# Patient Record
Sex: MALE | Race: WHITE | Employment: OTHER | URBAN - METROPOLITAN AREA
[De-identification: names, ages, dates, MRNs, and addresses within clinical notes are randomized per-mention and may not be internally consistent; named-entity substitution may affect disease eponyms.]

---

## 2020-04-27 ENCOUNTER — HOSPITAL ENCOUNTER (OUTPATIENT)
Age: 75
Discharge: HOME | End: 2020-05-01
Payer: MEDICARE

## 2020-04-27 ENCOUNTER — HOSPITAL ENCOUNTER (OUTPATIENT)
Dept: PREADMISSION TESTING | Age: 75
End: 2020-04-27
Payer: MEDICARE

## 2020-04-27 VITALS
HEIGHT: 68 IN | SYSTOLIC BLOOD PRESSURE: 157 MMHG | OXYGEN SATURATION: 97 % | HEART RATE: 62 BPM | WEIGHT: 252 LBS | DIASTOLIC BLOOD PRESSURE: 83 MMHG | BODY MASS INDEX: 38.19 KG/M2 | TEMPERATURE: 98 F | RESPIRATION RATE: 16 BRPM

## 2020-04-27 LAB
ABSOLUTE EOS #: 0.32 K/UL (ref 0–0.44)
ABSOLUTE IMMATURE GRANULOCYTE: 0.06 K/UL (ref 0–0.3)
ABSOLUTE LYMPH #: 1.27 K/UL (ref 1.1–3.7)
ABSOLUTE MONO #: 0.74 K/UL (ref 0.1–1.2)
ANION GAP SERPL CALCULATED.3IONS-SCNC: 16 MMOL/L (ref 9–17)
BASOPHILS # BLD: 1 % (ref 0–2)
BASOPHILS ABSOLUTE: 0.08 K/UL (ref 0–0.2)
BUN BLDV-MCNC: 23 MG/DL (ref 8–23)
CHLORIDE BLD-SCNC: 108 MMOL/L (ref 98–107)
CO2: 19 MMOL/L (ref 20–31)
CREAT SERPL-MCNC: 0.99 MG/DL (ref 0.7–1.2)
DIFFERENTIAL TYPE: ABNORMAL
EOSINOPHILS RELATIVE PERCENT: 4 % (ref 1–4)
GFR AFRICAN AMERICAN: >60 ML/MIN
GFR NON-AFRICAN AMERICAN: >60 ML/MIN
GFR SERPL CREATININE-BSD FRML MDRD: NORMAL ML/MIN/{1.73_M2}
GFR SERPL CREATININE-BSD FRML MDRD: NORMAL ML/MIN/{1.73_M2}
HCT VFR BLD CALC: 47.2 % (ref 40.7–50.3)
HEMOGLOBIN: 15.7 G/DL (ref 13–17)
IMMATURE GRANULOCYTES: 1 %
LYMPHOCYTES # BLD: 14 % (ref 24–43)
MCH RBC QN AUTO: 29.3 PG (ref 25.2–33.5)
MCHC RBC AUTO-ENTMCNC: 33.3 G/DL (ref 28.4–34.8)
MCV RBC AUTO: 88.2 FL (ref 82.6–102.9)
MONOCYTES # BLD: 8 % (ref 3–12)
NRBC AUTOMATED: 0 PER 100 WBC
PDW BLD-RTO: 14.5 % (ref 11.8–14.4)
PLATELET # BLD: 189 K/UL (ref 138–453)
PLATELET ESTIMATE: ABNORMAL
PMV BLD AUTO: 9.7 FL (ref 8.1–13.5)
POTASSIUM SERPL-SCNC: 4.5 MMOL/L (ref 3.7–5.3)
RBC # BLD: 5.35 M/UL (ref 4.21–5.77)
RBC # BLD: ABNORMAL 10*6/UL
SEG NEUTROPHILS: 72 % (ref 36–65)
SEGMENTED NEUTROPHILS ABSOLUTE COUNT: 6.68 K/UL (ref 1.5–8.1)
SODIUM BLD-SCNC: 143 MMOL/L (ref 135–144)
WBC # BLD: 9.2 K/UL (ref 3.5–11.3)
WBC # BLD: ABNORMAL 10*3/UL

## 2020-04-27 PROCEDURE — 36415 COLL VENOUS BLD VENIPUNCTURE: CPT

## 2020-04-27 PROCEDURE — 84520 ASSAY OF UREA NITROGEN: CPT

## 2020-04-27 PROCEDURE — 82565 ASSAY OF CREATININE: CPT

## 2020-04-27 PROCEDURE — 85025 COMPLETE CBC W/AUTO DIFF WBC: CPT

## 2020-04-27 PROCEDURE — 80051 ELECTROLYTE PANEL: CPT

## 2020-04-27 RX ORDER — RAMIPRIL 5 MG/1
5 CAPSULE ORAL DAILY
COMMUNITY

## 2020-04-27 RX ORDER — CEFAZOLIN SODIUM 2 G/50ML
2 SOLUTION INTRAVENOUS ONCE
Status: CANCELLED | OUTPATIENT
Start: 2020-05-14

## 2020-04-27 NOTE — PRE-PROCEDURE INSTRUCTIONS
you.    · Do not wear any jewelry or body piercings day of surgery. Also, NO lotion, perfume or deodorant to be used the day of surgery. No nail polish on the operative extremity (arm/leg surgeries)    · Do not bring any valuables such as jewelry, cash, or credit cards. If you are staying overnight with us, please bring a small bag of personal items.  Please wear loose, comfortable clothing. If you are potentially going to have a cast or brace bring clothing that will fit over them.  In case of illness - If you have cold or flu like symptoms (high fever, runny nose, sore throat, cough, etc.) rash, nausea, vomiting, loose stools, and/or recent contact with someone who has a contagious disease (chicken pox, measles, etc.) Please call your doctor before coming to the hospital.     If your child is having surgery please make arrangements for any other children to be cared for at home on the day of surgery. Other children are not permitted in recovery room and we want you to be able to spend time with the patient. If other arrangements are not available then we suggest that you have a second adult to stay in the waiting room. Day of Surgery/Procedure:    As a patient at [place] [address] you can expect quality medical and nursing care that is centered on your individual needs. Our goal is to make your surgical experience as comfortable as possible    . Transportation After Your Surgery/Procedure: You will need a friend or family member to drive you home after your procedure. Your  must be 25years of age or older and able to sign off on your discharge instructions. A taxi cab or any other form of public transportation is not acceptable. Your friend or family member must stay at the hospital throughout your procedure.     Someone must remain with you for the first 24 hours

## 2020-04-30 LAB
EKG ATRIAL RATE: 63 BPM
EKG P AXIS: 44 DEGREES
EKG P-R INTERVAL: 186 MS
EKG Q-T INTERVAL: 400 MS
EKG QRS DURATION: 82 MS
EKG QTC CALCULATION (BAZETT): 409 MS
EKG R AXIS: -44 DEGREES
EKG T AXIS: 37 DEGREES
EKG VENTRICULAR RATE: 63 BPM

## 2020-05-12 ENCOUNTER — HOSPITAL ENCOUNTER (OUTPATIENT)
Dept: PREADMISSION TESTING | Age: 75
Discharge: HOME | End: 2020-05-16
Payer: MEDICARE

## 2020-05-12 PROCEDURE — U0004 COV-19 TEST NON-CDC HGH THRU: HCPCS

## 2020-05-13 LAB
SARS-COV-2, PCR: NOT DETECTED
SARS-COV-2, RAPID: NORMAL
SARS-COV-2: NORMAL
SOURCE: NORMAL

## 2020-05-14 ENCOUNTER — ANESTHESIA (OUTPATIENT)
Dept: OPERATING ROOM | Age: 75
End: 2020-05-14
Payer: MEDICARE

## 2020-05-14 ENCOUNTER — ANESTHESIA EVENT (OUTPATIENT)
Dept: OPERATING ROOM | Age: 75
End: 2020-05-14
Payer: MEDICARE

## 2020-05-14 ENCOUNTER — HOSPITAL ENCOUNTER (OUTPATIENT)
Age: 75
Setting detail: OUTPATIENT SURGERY
Discharge: HOME | End: 2020-05-14
Attending: SURGERY | Admitting: SURGERY
Payer: MEDICARE

## 2020-05-14 VITALS
HEIGHT: 68 IN | OXYGEN SATURATION: 93 % | DIASTOLIC BLOOD PRESSURE: 75 MMHG | RESPIRATION RATE: 16 BRPM | SYSTOLIC BLOOD PRESSURE: 142 MMHG | HEART RATE: 62 BPM | TEMPERATURE: 97.5 F | BODY MASS INDEX: 38.19 KG/M2 | WEIGHT: 252 LBS

## 2020-05-14 VITALS — OXYGEN SATURATION: 92 % | SYSTOLIC BLOOD PRESSURE: 106 MMHG | DIASTOLIC BLOOD PRESSURE: 64 MMHG | TEMPERATURE: 53.2 F

## 2020-05-14 PROCEDURE — 7100000011 HC PHASE II RECOVERY - ADDTL 15 MIN: Performed by: SURGERY

## 2020-05-14 PROCEDURE — 6360000002 HC RX W HCPCS: Performed by: SURGERY

## 2020-05-14 PROCEDURE — 7100000010 HC PHASE II RECOVERY - FIRST 15 MIN: Performed by: SURGERY

## 2020-05-14 PROCEDURE — 2500000003 HC RX 250 WO HCPCS: Performed by: NURSE ANESTHETIST, CERTIFIED REGISTERED

## 2020-05-14 PROCEDURE — 3700000000 HC ANESTHESIA ATTENDED CARE: Performed by: SURGERY

## 2020-05-14 PROCEDURE — 2580000003 HC RX 258: Performed by: ANESTHESIOLOGY

## 2020-05-14 PROCEDURE — 2500000003 HC RX 250 WO HCPCS: Performed by: SURGERY

## 2020-05-14 PROCEDURE — 3600000019 HC SURGERY ROBOT ADDTL 15MIN: Performed by: SURGERY

## 2020-05-14 PROCEDURE — 6360000002 HC RX W HCPCS: Performed by: NURSE ANESTHETIST, CERTIFIED REGISTERED

## 2020-05-14 PROCEDURE — 3700000001 HC ADD 15 MINUTES (ANESTHESIA): Performed by: SURGERY

## 2020-05-14 PROCEDURE — S2900 ROBOTIC SURGICAL SYSTEM: HCPCS | Performed by: SURGERY

## 2020-05-14 PROCEDURE — C1760 CLOSURE DEV, VASC: HCPCS | Performed by: SURGERY

## 2020-05-14 PROCEDURE — 7100000000 HC PACU RECOVERY - FIRST 15 MIN: Performed by: SURGERY

## 2020-05-14 PROCEDURE — 2580000003 HC RX 258: Performed by: NURSE ANESTHETIST, CERTIFIED REGISTERED

## 2020-05-14 PROCEDURE — 6370000000 HC RX 637 (ALT 250 FOR IP): Performed by: ANESTHESIOLOGY

## 2020-05-14 PROCEDURE — 6360000002 HC RX W HCPCS: Performed by: ANESTHESIOLOGY

## 2020-05-14 PROCEDURE — 2709999900 HC NON-CHARGEABLE SUPPLY: Performed by: SURGERY

## 2020-05-14 PROCEDURE — 7100000001 HC PACU RECOVERY - ADDTL 15 MIN: Performed by: SURGERY

## 2020-05-14 PROCEDURE — 3600000009 HC SURGERY ROBOT BASE: Performed by: SURGERY

## 2020-05-14 PROCEDURE — C1781 MESH (IMPLANTABLE): HCPCS | Performed by: SURGERY

## 2020-05-14 DEVICE — MESH SURG DIA4.5IN CIR SEPRA TECHNOLOGY VENTRALIGHT: Type: IMPLANTABLE DEVICE | Site: ABDOMEN | Status: FUNCTIONAL

## 2020-05-14 RX ORDER — SODIUM CHLORIDE, SODIUM LACTATE, POTASSIUM CHLORIDE, CALCIUM CHLORIDE 600; 310; 30; 20 MG/100ML; MG/100ML; MG/100ML; MG/100ML
INJECTION, SOLUTION INTRAVENOUS CONTINUOUS PRN
Status: DISCONTINUED | OUTPATIENT
Start: 2020-05-14 | End: 2020-05-14 | Stop reason: SDUPTHER

## 2020-05-14 RX ORDER — ONDANSETRON 2 MG/ML
4 INJECTION INTRAMUSCULAR; INTRAVENOUS
Status: DISCONTINUED | OUTPATIENT
Start: 2020-05-14 | End: 2020-05-14 | Stop reason: HOSPADM

## 2020-05-14 RX ORDER — GLYCOPYRROLATE 1 MG/5 ML
SYRINGE (ML) INTRAVENOUS PRN
Status: DISCONTINUED | OUTPATIENT
Start: 2020-05-14 | End: 2020-05-14 | Stop reason: SDUPTHER

## 2020-05-14 RX ORDER — PROPOFOL 10 MG/ML
INJECTION, EMULSION INTRAVENOUS PRN
Status: DISCONTINUED | OUTPATIENT
Start: 2020-05-14 | End: 2020-05-14 | Stop reason: SDUPTHER

## 2020-05-14 RX ORDER — HYDROMORPHONE HCL 110MG/55ML
0.5 PATIENT CONTROLLED ANALGESIA SYRINGE INTRAVENOUS EVERY 5 MIN PRN
Status: DISCONTINUED | OUTPATIENT
Start: 2020-05-14 | End: 2020-05-14 | Stop reason: HOSPADM

## 2020-05-14 RX ORDER — OXYCODONE HYDROCHLORIDE AND ACETAMINOPHEN 5; 325 MG/1; MG/1
2 TABLET ORAL PRN
Status: COMPLETED | OUTPATIENT
Start: 2020-05-14 | End: 2020-05-14

## 2020-05-14 RX ORDER — DEXAMETHASONE SODIUM PHOSPHATE 10 MG/ML
4 INJECTION INTRAMUSCULAR; INTRAVENOUS
Status: DISCONTINUED | OUTPATIENT
Start: 2020-05-14 | End: 2020-05-14 | Stop reason: HOSPADM

## 2020-05-14 RX ORDER — FENTANYL CITRATE 50 UG/ML
25 INJECTION, SOLUTION INTRAMUSCULAR; INTRAVENOUS EVERY 5 MIN PRN
Status: COMPLETED | OUTPATIENT
Start: 2020-05-14 | End: 2020-05-14

## 2020-05-14 RX ORDER — HYDROCODONE BITARTRATE AND ACETAMINOPHEN 5; 325 MG/1; MG/1
1 TABLET ORAL EVERY 6 HOURS PRN
Qty: 18 TABLET | Refills: 0 | Status: SHIPPED | OUTPATIENT
Start: 2020-05-14 | End: 2020-05-19

## 2020-05-14 RX ORDER — BUPIVACAINE HYDROCHLORIDE AND EPINEPHRINE 5; 5 MG/ML; UG/ML
INJECTION, SOLUTION EPIDURAL; INTRACAUDAL; PERINEURAL PRN
Status: DISCONTINUED | OUTPATIENT
Start: 2020-05-14 | End: 2020-05-14 | Stop reason: ALTCHOICE

## 2020-05-14 RX ORDER — LABETALOL HYDROCHLORIDE 5 MG/ML
5 INJECTION, SOLUTION INTRAVENOUS EVERY 10 MIN PRN
Status: DISCONTINUED | OUTPATIENT
Start: 2020-05-14 | End: 2020-05-14 | Stop reason: HOSPADM

## 2020-05-14 RX ORDER — DIPHENHYDRAMINE HYDROCHLORIDE 50 MG/ML
12.5 INJECTION INTRAMUSCULAR; INTRAVENOUS
Status: DISCONTINUED | OUTPATIENT
Start: 2020-05-14 | End: 2020-05-14 | Stop reason: HOSPADM

## 2020-05-14 RX ORDER — NEOSTIGMINE METHYLSULFATE 5 MG/5 ML
SYRINGE (ML) INTRAVENOUS PRN
Status: DISCONTINUED | OUTPATIENT
Start: 2020-05-14 | End: 2020-05-14 | Stop reason: SDUPTHER

## 2020-05-14 RX ORDER — ONDANSETRON 2 MG/ML
INJECTION INTRAMUSCULAR; INTRAVENOUS PRN
Status: DISCONTINUED | OUTPATIENT
Start: 2020-05-14 | End: 2020-05-14 | Stop reason: SDUPTHER

## 2020-05-14 RX ORDER — OXYCODONE HYDROCHLORIDE AND ACETAMINOPHEN 5; 325 MG/1; MG/1
1 TABLET ORAL PRN
Status: COMPLETED | OUTPATIENT
Start: 2020-05-14 | End: 2020-05-14

## 2020-05-14 RX ORDER — SODIUM CHLORIDE 9 MG/ML
INJECTION, SOLUTION INTRAVENOUS CONTINUOUS
Status: DISCONTINUED | OUTPATIENT
Start: 2020-05-14 | End: 2020-05-14

## 2020-05-14 RX ORDER — FENTANYL CITRATE 50 UG/ML
25 INJECTION, SOLUTION INTRAMUSCULAR; INTRAVENOUS EVERY 5 MIN PRN
Status: DISCONTINUED | OUTPATIENT
Start: 2020-05-14 | End: 2020-05-14 | Stop reason: HOSPADM

## 2020-05-14 RX ORDER — SODIUM CHLORIDE 0.9 % (FLUSH) 0.9 %
10 SYRINGE (ML) INJECTION PRN
Status: DISCONTINUED | OUTPATIENT
Start: 2020-05-14 | End: 2020-05-14 | Stop reason: HOSPADM

## 2020-05-14 RX ORDER — MEPERIDINE HYDROCHLORIDE 50 MG/ML
12.5 INJECTION INTRAMUSCULAR; INTRAVENOUS; SUBCUTANEOUS EVERY 5 MIN PRN
Status: DISCONTINUED | OUTPATIENT
Start: 2020-05-14 | End: 2020-05-14 | Stop reason: HOSPADM

## 2020-05-14 RX ORDER — ROCURONIUM BROMIDE 10 MG/ML
INJECTION, SOLUTION INTRAVENOUS PRN
Status: DISCONTINUED | OUTPATIENT
Start: 2020-05-14 | End: 2020-05-14 | Stop reason: SDUPTHER

## 2020-05-14 RX ORDER — HYDRALAZINE HYDROCHLORIDE 20 MG/ML
5 INJECTION INTRAMUSCULAR; INTRAVENOUS EVERY 10 MIN PRN
Status: DISCONTINUED | OUTPATIENT
Start: 2020-05-14 | End: 2020-05-14 | Stop reason: HOSPADM

## 2020-05-14 RX ORDER — CEFAZOLIN SODIUM 2 G/50ML
2 SOLUTION INTRAVENOUS ONCE
Status: COMPLETED | OUTPATIENT
Start: 2020-05-14 | End: 2020-05-14

## 2020-05-14 RX ORDER — LIDOCAINE HYDROCHLORIDE 10 MG/ML
1 INJECTION, SOLUTION EPIDURAL; INFILTRATION; INTRACAUDAL; PERINEURAL
Status: DISCONTINUED | OUTPATIENT
Start: 2020-05-14 | End: 2020-05-14 | Stop reason: HOSPADM

## 2020-05-14 RX ORDER — SODIUM CHLORIDE 0.9 % (FLUSH) 0.9 %
10 SYRINGE (ML) INJECTION EVERY 12 HOURS SCHEDULED
Status: DISCONTINUED | OUTPATIENT
Start: 2020-05-14 | End: 2020-05-14 | Stop reason: HOSPADM

## 2020-05-14 RX ORDER — FENTANYL CITRATE 50 UG/ML
INJECTION, SOLUTION INTRAMUSCULAR; INTRAVENOUS PRN
Status: DISCONTINUED | OUTPATIENT
Start: 2020-05-14 | End: 2020-05-14 | Stop reason: SDUPTHER

## 2020-05-14 RX ORDER — LIDOCAINE HYDROCHLORIDE 20 MG/ML
INJECTION, SOLUTION EPIDURAL; INFILTRATION; INTRACAUDAL; PERINEURAL PRN
Status: DISCONTINUED | OUTPATIENT
Start: 2020-05-14 | End: 2020-05-14 | Stop reason: SDUPTHER

## 2020-05-14 RX ORDER — SODIUM CHLORIDE, SODIUM LACTATE, POTASSIUM CHLORIDE, CALCIUM CHLORIDE 600; 310; 30; 20 MG/100ML; MG/100ML; MG/100ML; MG/100ML
INJECTION, SOLUTION INTRAVENOUS CONTINUOUS
Status: DISCONTINUED | OUTPATIENT
Start: 2020-05-14 | End: 2020-05-14 | Stop reason: HOSPADM

## 2020-05-14 RX ORDER — DEXAMETHASONE SODIUM PHOSPHATE 10 MG/ML
INJECTION INTRAMUSCULAR; INTRAVENOUS PRN
Status: DISCONTINUED | OUTPATIENT
Start: 2020-05-14 | End: 2020-05-14 | Stop reason: SDUPTHER

## 2020-05-14 RX ADMIN — ONDANSETRON 4 MG: 2 INJECTION, SOLUTION INTRAMUSCULAR; INTRAVENOUS at 08:56

## 2020-05-14 RX ADMIN — SODIUM CHLORIDE, POTASSIUM CHLORIDE, SODIUM LACTATE AND CALCIUM CHLORIDE: 600; 310; 30; 20 INJECTION, SOLUTION INTRAVENOUS at 06:25

## 2020-05-14 RX ADMIN — SODIUM CHLORIDE, POTASSIUM CHLORIDE, SODIUM LACTATE AND CALCIUM CHLORIDE: 600; 310; 30; 20 INJECTION, SOLUTION INTRAVENOUS at 07:25

## 2020-05-14 RX ADMIN — Medication 50 MCG: at 07:25

## 2020-05-14 RX ADMIN — Medication 3 MG: at 09:02

## 2020-05-14 RX ADMIN — FENTANYL CITRATE 25 MCG: 50 INJECTION INTRAMUSCULAR; INTRAVENOUS at 10:10

## 2020-05-14 RX ADMIN — FENTANYL CITRATE 25 MCG: 50 INJECTION INTRAMUSCULAR; INTRAVENOUS at 09:53

## 2020-05-14 RX ADMIN — OXYCODONE HYDROCHLORIDE AND ACETAMINOPHEN 1 TABLET: 5; 325 TABLET ORAL at 10:25

## 2020-05-14 RX ADMIN — Medication 0.4 MG: at 07:47

## 2020-05-14 RX ADMIN — Medication 0.6 MG: at 09:02

## 2020-05-14 RX ADMIN — Medication 50 MCG: at 07:30

## 2020-05-14 RX ADMIN — ROCURONIUM BROMIDE 50 MG: 10 INJECTION, SOLUTION INTRAVENOUS at 07:30

## 2020-05-14 RX ADMIN — LIDOCAINE HYDROCHLORIDE 60 MG: 20 INJECTION, SOLUTION EPIDURAL; INFILTRATION; INTRACAUDAL; PERINEURAL at 07:30

## 2020-05-14 RX ADMIN — DEXAMETHASONE SODIUM PHOSPHATE 10 MG: 10 INJECTION INTRAMUSCULAR; INTRAVENOUS at 07:30

## 2020-05-14 RX ADMIN — PROPOFOL 200 MG: 10 INJECTION, EMULSION INTRAVENOUS at 07:30

## 2020-05-14 RX ADMIN — FENTANYL CITRATE 25 MCG: 50 INJECTION INTRAMUSCULAR; INTRAVENOUS at 09:37

## 2020-05-14 RX ADMIN — FENTANYL CITRATE 25 MCG: 50 INJECTION INTRAMUSCULAR; INTRAVENOUS at 09:44

## 2020-05-14 RX ADMIN — CEFAZOLIN SODIUM 2 G: 2 SOLUTION INTRAVENOUS at 07:38

## 2020-05-14 ASSESSMENT — PULMONARY FUNCTION TESTS
PIF_VALUE: 30
PIF_VALUE: 10
PIF_VALUE: 20
PIF_VALUE: 26
PIF_VALUE: 26
PIF_VALUE: 25
PIF_VALUE: 11
PIF_VALUE: 24
PIF_VALUE: 1
PIF_VALUE: 20
PIF_VALUE: 11
PIF_VALUE: 30
PIF_VALUE: 28
PIF_VALUE: 24
PIF_VALUE: 28
PIF_VALUE: 2
PIF_VALUE: 19
PIF_VALUE: 25
PIF_VALUE: 19
PIF_VALUE: 31
PIF_VALUE: 20
PIF_VALUE: 31
PIF_VALUE: 30
PIF_VALUE: 19
PIF_VALUE: 30
PIF_VALUE: 31
PIF_VALUE: 30
PIF_VALUE: 19
PIF_VALUE: 1
PIF_VALUE: 30
PIF_VALUE: 26
PIF_VALUE: 25
PIF_VALUE: 21
PIF_VALUE: 29
PIF_VALUE: 19
PIF_VALUE: 19
PIF_VALUE: 20
PIF_VALUE: 19
PIF_VALUE: 30
PIF_VALUE: 29
PIF_VALUE: 28
PIF_VALUE: 30
PIF_VALUE: 29
PIF_VALUE: 1
PIF_VALUE: 19
PIF_VALUE: 29
PIF_VALUE: 29
PIF_VALUE: 30
PIF_VALUE: 2
PIF_VALUE: 28
PIF_VALUE: 28
PIF_VALUE: 30
PIF_VALUE: 30
PIF_VALUE: 1
PIF_VALUE: 30
PIF_VALUE: 27
PIF_VALUE: 28
PIF_VALUE: 26
PIF_VALUE: 27
PIF_VALUE: 20
PIF_VALUE: 28
PIF_VALUE: 29
PIF_VALUE: 29
PIF_VALUE: 11
PIF_VALUE: 30
PIF_VALUE: 19
PIF_VALUE: 30
PIF_VALUE: 1
PIF_VALUE: 9
PIF_VALUE: 13
PIF_VALUE: 28
PIF_VALUE: 20
PIF_VALUE: 29
PIF_VALUE: 29
PIF_VALUE: 19
PIF_VALUE: 26
PIF_VALUE: 25
PIF_VALUE: 0
PIF_VALUE: 25
PIF_VALUE: 10
PIF_VALUE: 28
PIF_VALUE: 25
PIF_VALUE: 27
PIF_VALUE: 29
PIF_VALUE: 25
PIF_VALUE: 20
PIF_VALUE: 29
PIF_VALUE: 14
PIF_VALUE: 30
PIF_VALUE: 25
PIF_VALUE: 20
PIF_VALUE: 0
PIF_VALUE: 26
PIF_VALUE: 30
PIF_VALUE: 8
PIF_VALUE: 29
PIF_VALUE: 24
PIF_VALUE: 19
PIF_VALUE: 25
PIF_VALUE: 30
PIF_VALUE: 31
PIF_VALUE: 29

## 2020-05-14 ASSESSMENT — PAIN - FUNCTIONAL ASSESSMENT: PAIN_FUNCTIONAL_ASSESSMENT: 0-10

## 2020-05-14 ASSESSMENT — PAIN SCALES - GENERAL
PAINLEVEL_OUTOF10: 5
PAINLEVEL_OUTOF10: 5
PAINLEVEL_OUTOF10: 4
PAINLEVEL_OUTOF10: 5
PAINLEVEL_OUTOF10: 4

## 2020-05-14 NOTE — ANESTHESIA PRE PROCEDURE
Current Facility-Administered Medications   Medication Dose Route Frequency Provider Last Rate Last Dose    lactated ringers infusion   Intravenous Continuous Livan Fish  mL/hr at 05/14/20 0786      sodium chloride flush 0.9 % injection 10 mL  10 mL Intravenous 2 times per day Livan Fish MD        sodium chloride flush 0.9 % injection 10 mL  10 mL Intravenous PRN Livan Fish MD        lidocaine PF 1 % injection 1 mL  1 mL Intradermal Once PRN Livan Fish MD        ceFAZolin (ANCEF) 2 g in dextrose 3 % 50 mL IVPB (duplex)  2 g Intravenous Once Jamilah Mujica MD           Allergies:  No Known Allergies    Problem List:  There is no problem list on file for this patient. Past Medical History:        Diagnosis Date    Cancer [facility]) 10/23/2009    PROSTATE    History of cardiac murmur as a child     Hyperlipidemia     Hypertension     Urinary incontinence     Due to prostate surgery per pt. Past Surgical History:        Procedure Laterality Date    APPENDECTOMY      PROSTATECTOMY  10/23/2009    WRIST SURGERY Right 2017       Social History:    Social History     Tobacco Use    Smoking status: Never Smoker    Smokeless tobacco: Never Used   Substance Use Topics    Alcohol use:  Yes     Alcohol/week: 0.0 standard drinks     Comment: Occasionally                                Counseling given: Not Answered      Vital Signs (Current):   Vitals:    05/14/20 0610 05/14/20 0612   BP: (!) 174/88    Pulse: 62    Resp: 18    Temp: 98.6 °F (37 °C)    TempSrc: Temporal    SpO2: 96%    Weight:  252 lb (114.3 kg)   Height:  5' 8\" (1.727 m)                                              BP Readings from Last 3 Encounters:   05/14/20 (!) 174/88   04/27/20 (!) 157/83   05/18/16 140/79       NPO Status: Time of last liquid consumption: 1800                        Time of last solid consumption: 1800                        Date of last liquid consumption: 05/13/20                        Date of last solid food consumption: 05/13/20    BMI:   Wt Readings from Last 3 Encounters:   05/14/20 252 lb (114.3 kg)   04/27/20 252 lb (114.3 kg)   05/18/16 229 lb (103.9 kg)     Body mass index is 38.32 kg/m². CBC:   Lab Results   Component Value Date    WBC 9.2 04/27/2020    RBC 5.35 04/27/2020    HGB 15.7 04/27/2020    HCT 47.2 04/27/2020    MCV 88.2 04/27/2020    RDW 14.5 04/27/2020     04/27/2020       CMP:   Lab Results   Component Value Date     04/27/2020    K 4.5 04/27/2020     04/27/2020    CO2 19 04/27/2020    BUN 23 04/27/2020    CREATININE 0.99 04/27/2020    GFRAA >60 04/27/2020    LABGLOM >60 04/27/2020       POC Tests: No results for input(s): POCGLU, POCNA, POCK, POCCL, POCBUN, POCHEMO, POCHCT in the last 72 hours. Coags: No results found for: PROTIME, INR, APTT    HCG (If Applicable): No results found for: PREGTESTUR, PREGSERUM, HCG, HCGQUANT     ABGs: No results found for: PHART, PO2ART, NSP2DRQ, TFP6IKU, BEART, D1EERQIW     Type & Screen (If Applicable):  No results found for: LABABO, LABRH    Drug/Infectious Status (If Applicable):  No results found for: HIV, HEPCAB    COVID-19 Screening (If Applicable):   Lab Results   Component Value Date    COVID19 Not Detected 05/12/2020         Anesthesia Evaluation  Patient summary reviewed and Nursing notes reviewed  Airway: Mallampati: II  TM distance: >3 FB   Neck ROM: full  Mouth opening: > = 3 FB Dental: normal exam         Pulmonary:normal exam  breath sounds clear to auscultation                             Cardiovascular:  Exercise tolerance: good (>4 METS),           Rhythm: regular  Rate: normal                    Neuro/Psych:               GI/Hepatic/Renal:             Endo/Other:                     Abdominal:       Abdomen: soft. Vascular:                                        Anesthesia Plan      general     ASA 3       Induction: intravenous.     MIPS: Postoperative opioids intended and Prophylactic antiemetics

## 2020-05-14 NOTE — H&P
mouth    Historical Provider, MD   Vitamin E 200 UNITS TABS Take 400 mg by mouth    Historical Provider, MD   Cholecalciferol (VITAMIN D3) 5000 UNITS TABS Take by mouth    Historical Provider, MD   Ascorbic Acid (VITAMIN C) 500 MG CAPS Take by mouth    Historical Provider, MD   Multiple Vitamins-Minerals (THERAPEUTIC MULTIVITAMIN-MINERALS) tablet Take 1 tablet by mouth daily    Historical Provider, MD   Arginine 500 MG CAPS Take by mouth    Historical Provider, MD         This is a 70 y.o. obese male who is pleasant, cooperative, alert and oriented x3, in no acute distress. Heart: Heart sounds are normal.  HR 62 regular rate and rhythm without murmur, gallop or rub. Lungs: Normal respiratory effort with good air exchange, unlabored and clear to auscultation without wheezes or rales bilaterally   Abdomen: soft reducible umbilical hernia w/o tenderness Abdomen nontender, nondistended with bowel sounds . Labs:  Recent Labs     04/27/20  0945   HGB 15.7   HCT 47.2   WBC 9.2   MCV 88.2         K 4.5   *   CO2 19*   BUN 23   CREATININE 0.99       Recent Labs     05/12/20  1000   COVID19 Not Detected                   Myak, Romana Gladden APRN, ANP-BC  Electronically signed 5/14/2020 at 6:15 AM      DELANEY Nix - CNP   Nurse Practitioner   General Surgery   H&P   Cosign Needed   Date of Service:  4/27/2020  9:00 AM               Cosign Needed        Expand All Collapse All     History and Physical Service   [facility]            Date of Evaluation:     4/27/2020  Patient name:              Lynne Clark  MRN:                           [id number]  YOB: 1945  PCP:                            Yi Anand     History Obtained From:      Patient     History of Present Illness:       This is Lynne Clark a 70 y.o. male who presents for a pre-admission testing appointment for an upcoming BUN 23 8 - 23 mg/dL     CREATININE 0.99 0.70 - 1.20 mg/dL     GFR Non-African American >60 >60 mL/min     GFR African American >60 >60 mL/min     GFR Comment            GFR Staging NOT REPORTED     CBC Auto Differential     Collection Time: 20  9:45 AM   Result Value Ref Range     WBC 9.2 3.5 - 11.3 k/uL     RBC 5.35 4.21 - 5.77 m/uL     Hemoglobin 15.7 13.0 - 17.0 g/dL     Hematocrit 47.2 40.7 - 50.3 %     MCV 88.2 82.6 - 102.9 fL     MCH 29.3 25.2 - 33.5 pg     MCHC 33.3 28.4 - 34.8 g/dL     RDW 14.5 (H) 11.8 - 14.4 %     Platelets 710 662 - 078 k/uL     MPV 9.7 8.1 - 13.5 fL     NRBC Automated 0.0 0.0 per 100 WBC     Differential Type NOT REPORTED       Seg Neutrophils 72 (H) 36 - 65 %     Lymphocytes 14 (L) 24 - 43 %     Monocytes 8 3 - 12 %     Eosinophils % 4 1 - 4 %     Basophils 1 0 - 2 %     Immature Granulocytes 1 (H) 0 %     Segs Absolute 6.68 1.50 - 8.10 k/uL     Absolute Lymph # 1.27 1.10 - 3.70 k/uL     Absolute Mono # 0.74 0.10 - 1.20 k/uL     Absolute Eos # 0.32 0.00 - 0.44 k/uL     Basophils Absolute 0.08 0.00 - 0.20 k/uL     Absolute Immature Granulocyte 0.06 0.00 - 0.30 k/uL     WBC Morphology NOT REPORTED       RBC Morphology ANISOCYTOSIS PRESENT       Platelet Estimate NOT REPORTED     Electrolyte Panel     Collection Time: 20  9:45 AM   Result Value Ref Range     Sodium 143 135 - 144 mmol/L     Potassium 4.5 3.7 - 5.3 mmol/L     Chloride 108 (H) 98 - 107 mmol/L     CO2 19 (L) 20 - 31 mmol/L     Anion Gap 16 9 - 17 mmol/L                Recent Labs     20  0945   HGB 15.7   HCT 47.2   WBC 9.2   MCV 88.2      K 4.5   *   CO2 19*   BUN 23   CREATININE 0.99         EK2020. See Epic. Diagnosis:       1. Incisional hernia     Plans:      1.  Laparoscopic robotic incisional hernia repair with mesh        DELANEY Pedersen CNP  2020  10:18 AM               ·   Pre-Admission Testing Visit (Canceled) on 2020   ·   ·   Routing History ·   ·   Detailed Report   ·   H&P Info     Author Note Status Last Update User   DELANEY Sharma CNP Cosign Needed DELANEY Sharma CNP   Last Update Date/Time: 4/27/2020 10:22 AM   Chart Review Routing History     No routing history on file.

## 2023-09-21 ENCOUNTER — HOSPITAL ENCOUNTER (OUTPATIENT)
Dept: PREADMISSION TESTING | Age: 78
Discharge: HOME | End: 2023-09-21
Payer: MEDICARE

## 2023-09-21 VITALS
TEMPERATURE: 97.1 F | BODY MASS INDEX: 37.89 KG/M2 | DIASTOLIC BLOOD PRESSURE: 70 MMHG | OXYGEN SATURATION: 98 % | HEIGHT: 68 IN | SYSTOLIC BLOOD PRESSURE: 132 MMHG | HEART RATE: 65 BPM | RESPIRATION RATE: 18 BRPM | WEIGHT: 250 LBS

## 2023-09-21 LAB
ABO + RH BLD: NORMAL
ALBUMIN SERPL-MCNC: 4 G/DL (ref 3.5–5.2)
ALP SERPL-CCNC: 60 U/L (ref 40–129)
ALT SERPL-CCNC: 34 U/L (ref 5–41)
ANION GAP SERPL CALCULATED.3IONS-SCNC: 12 MMOL/L (ref 9–17)
ARM BAND NUMBER: NORMAL
AST SERPL-CCNC: 24 U/L
BASOPHILS # BLD: 0.07 K/UL (ref 0–0.2)
BASOPHILS NFR BLD: 1 % (ref 0–2)
BILIRUB SERPL-MCNC: 0.5 MG/DL (ref 0.3–1.2)
BILIRUB UR QL STRIP: NEGATIVE
BLOOD BANK SAMPLE EXPIRATION: NORMAL
BLOOD GROUP ANTIBODIES SERPL: NEGATIVE
BUN SERPL-MCNC: 27 MG/DL (ref 8–23)
BUN/CREAT SERPL: 27 (ref 9–20)
CALCIUM SERPL-MCNC: 8.8 MG/DL (ref 8.6–10.4)
CHLORIDE SERPL-SCNC: 105 MMOL/L (ref 98–107)
CLARITY UR: CLEAR
CO2 SERPL-SCNC: 21 MMOL/L (ref 20–31)
COLOR UR: YELLOW
COMMENT: NORMAL
CREAT SERPL-MCNC: 1 MG/DL (ref 0.7–1.2)
EOSINOPHIL # BLD: 0.24 K/UL (ref 0–0.44)
EOSINOPHILS RELATIVE PERCENT: 2 % (ref 1–4)
ERYTHROCYTE [DISTWIDTH] IN BLOOD BY AUTOMATED COUNT: 14.5 % (ref 11.8–14.4)
ERYTHROCYTE [SEDIMENTATION RATE] IN BLOOD BY PHOTOMETRIC METHOD: 18 MM/HR (ref 0–20)
GFR SERPL CREATININE-BSD FRML MDRD: >60 ML/MIN/1.73M2
GLUCOSE SERPL-MCNC: 111 MG/DL (ref 70–99)
GLUCOSE UR STRIP-MCNC: NEGATIVE MG/DL
HCT VFR BLD AUTO: 42 % (ref 40.7–50.3)
HGB BLD-MCNC: 14.1 G/DL (ref 13–17)
HGB UR QL STRIP.AUTO: NEGATIVE
IMM GRANULOCYTES # BLD AUTO: 0.11 K/UL (ref 0–0.3)
IMM GRANULOCYTES NFR BLD: 1 %
INR PPP: 1.1
KETONES UR STRIP-MCNC: NEGATIVE MG/DL
LEUKOCYTE ESTERASE UR QL STRIP: NEGATIVE
LYMPHOCYTES NFR BLD: 1.64 K/UL (ref 1.1–3.7)
LYMPHOCYTES RELATIVE PERCENT: 17 % (ref 24–43)
MCH RBC QN AUTO: 30.9 PG (ref 25.2–33.5)
MCHC RBC AUTO-ENTMCNC: 33.6 G/DL (ref 28.4–34.8)
MCV RBC AUTO: 91.9 FL (ref 82.6–102.9)
MONOCYTES NFR BLD: 0.85 K/UL (ref 0.1–1.2)
MONOCYTES NFR BLD: 9 % (ref 3–12)
NEUTROPHILS NFR BLD: 70 % (ref 36–65)
NEUTS SEG NFR BLD: 7.01 K/UL (ref 1.5–8.1)
NITRITE UR QL STRIP: NEGATIVE
NRBC BLD-RTO: 0 PER 100 WBC
PARTIAL THROMBOPLASTIN TIME: 32.3 SEC (ref 23.9–33.8)
PH UR STRIP: 6 [PH] (ref 5–8)
PLATELET # BLD AUTO: 187 K/UL (ref 138–453)
PMV BLD AUTO: 9.4 FL (ref 8.1–13.5)
POTASSIUM SERPL-SCNC: 4.1 MMOL/L (ref 3.7–5.3)
PROT SERPL-MCNC: 6.6 G/DL (ref 6.4–8.3)
PROT UR STRIP-MCNC: NEGATIVE MG/DL
PROTHROMBIN TIME: 13.6 SEC (ref 11.5–14.2)
RBC # BLD AUTO: 4.57 M/UL (ref 4.21–5.77)
RBC # BLD: ABNORMAL 10*6/UL
SODIUM SERPL-SCNC: 138 MMOL/L (ref 135–144)
SP GR UR STRIP: 1 (ref 1–1.03)
UROBILINOGEN UR STRIP-ACNC: NORMAL EU/DL (ref 0–1)
WBC OTHER # BLD: 9.9 K/UL (ref 3.5–11.3)

## 2023-09-21 PROCEDURE — 86850 RBC ANTIBODY SCREEN: CPT

## 2023-09-21 PROCEDURE — 80053 COMPREHEN METABOLIC PANEL: CPT

## 2023-09-21 PROCEDURE — 85730 THROMBOPLASTIN TIME PARTIAL: CPT

## 2023-09-21 PROCEDURE — 93005 ELECTROCARDIOGRAM TRACING: CPT | Performed by: ORTHOPAEDIC SURGERY

## 2023-09-21 PROCEDURE — 36415 COLL VENOUS BLD VENIPUNCTURE: CPT

## 2023-09-21 PROCEDURE — 85652 RBC SED RATE AUTOMATED: CPT

## 2023-09-21 PROCEDURE — 87641 MR-STAPH DNA AMP PROBE: CPT

## 2023-09-21 PROCEDURE — 85025 COMPLETE CBC W/AUTO DIFF WBC: CPT

## 2023-09-21 PROCEDURE — 86901 BLOOD TYPING SEROLOGIC RH(D): CPT

## 2023-09-21 PROCEDURE — 85610 PROTHROMBIN TIME: CPT

## 2023-09-21 PROCEDURE — 81003 URINALYSIS AUTO W/O SCOPE: CPT

## 2023-09-21 PROCEDURE — 86900 BLOOD TYPING SEROLOGIC ABO: CPT

## 2023-09-21 RX ORDER — ACETAMINOPHEN 500 MG
1000 TABLET ORAL ONCE
OUTPATIENT
Start: 2023-10-18

## 2023-09-21 RX ORDER — GABAPENTIN 300 MG/1
300 CAPSULE ORAL ONCE
OUTPATIENT
Start: 2023-09-21 | End: 2023-09-21

## 2023-09-21 RX ORDER — LISINOPRIL 10 MG/1
10 TABLET ORAL DAILY
COMMUNITY

## 2023-09-21 RX ORDER — CELECOXIB 200 MG/1
200 CAPSULE ORAL ONCE
OUTPATIENT
Start: 2023-10-18

## 2023-09-21 RX ORDER — DIPHENHYDRAMINE HCL 25 MG
25 CAPSULE ORAL EVERY 6 HOURS PRN
COMMUNITY

## 2023-09-21 RX ORDER — ATORVASTATIN CALCIUM 10 MG/1
10 TABLET, FILM COATED ORAL DAILY
COMMUNITY

## 2023-09-21 ASSESSMENT — PROMIS GLOBAL HEALTH SCALE
SUM OF RESPONSES TO QUESTIONS 3, 6, 7, & 8: 13
IN GENERAL, HOW WOULD YOU RATE YOUR MENTAL HEALTH, INCLUDING YOUR MOOD AND YOUR ABILITY TO THINK [ON A SCALE OF 1 (POOR) TO 5 (EXCELLENT)]?: 4
IN GENERAL, HOW WOULD YOU RATE YOUR SATISFACTION WITH YOUR SOCIAL ACTIVITIES AND RELATIONSHIPS [ON A SCALE OF 1 (POOR) TO 5 (EXCELLENT)]?: 5
IN GENERAL, PLEASE RATE HOW WELL YOU CARRY OUT YOUR USUAL SOCIAL ACTIVITIES (INCLUDES ACTIVITIES AT HOME, AT WORK, AND IN YOUR COMMUNITY, AND RESPONSIBILITIES AS A PARENT, CHILD, SPOUSE, EMPLOYEE, FRIEND, ETC) [ON A SCALE OF 1 (POOR) TO 5 (EXCELLENT)]?: 4
IN THE PAST 7 DAYS, HOW OFTEN HAVE YOU BEEN BOTHERED BY EMOTIONAL PROBLEMS, SUCH AS FEELING ANXIOUS, DEPRESSED, OR IRRITABLE [ON A SCALE FROM 1 (NEVER) TO 5 (ALWAYS)]?: 5
HOW IS THE PROMIS V1.1 BEING ADMINISTERED?: 0
IN GENERAL, WOULD YOU SAY YOUR HEALTH IS...[ON A SCALE OF 1 (POOR) TO 5 (EXCELLENT)]: 4
WHO IS THE PERSON COMPLETING THE PROMIS V1.1 SURVEY?: 0
IN GENERAL, WOULD YOU SAY YOUR QUALITY OF LIFE IS...[ON A SCALE OF 1 (POOR) TO 5 (EXCELLENT)]: 4
IN GENERAL, HOW WOULD YOU RATE YOUR PHYSICAL HEALTH [ON A SCALE OF 1 (POOR) TO 5 (EXCELLENT)]?: 4
TO WHAT EXTENT ARE YOU ABLE TO CARRY OUT YOUR EVERYDAY PHYSICAL ACTIVITIES SUCH AS WALKING, CLIMBING STAIRS, CARRYING GROCERIES, OR MOVING A CHAIR [ON A SCALE OF 1 (NOT AT ALL) TO 5 (COMPLETELY)]?: 3
IN THE PAST 7 DAYS, HOW WOULD YOU RATE YOUR PAIN ON AVERAGE [ON A SCALE FROM 0 (NO PAIN) TO 10 (WORST IMAGINABLE PAIN)]?: 3
SUM OF RESPONSES TO QUESTIONS 2, 4, 5, & 10: 18
IN THE PAST 7 DAYS, HOW WOULD YOU RATE YOUR FATIGUE ON AVERAGE [ON A SCALE FROM 1 (NONE) TO 5 (VERY SEVERE)]?: 3

## 2023-09-21 ASSESSMENT — KOOS JR
HOW SEVERE IS YOUR KNEE STIFFNESS AFTER FIRST WAKING IN MORNING: 2
KOOS JR TOTAL INTERVAL SCORE: 63.776
GOING UP OR DOWN STAIRS: 2
STRAIGHTENING KNEE FULLY: 0
STANDING UPRIGHT: 0
BENDING TO THE FLOOR TO PICK UP OBJECT: 2
RISING FROM SITTING: 1
TWISING OR PIVOTING ON KNEE: 2

## 2023-09-21 NOTE — PRE-PROCEDURE INSTRUCTIONS
stay with you, your procedure may be cancelled. As a patient at [facility] you can expect quality medical and nursing care that is centered on you individual needs. Our goal is to make your surgical experience as comfortable as possible.     Any questions about preparing for your surgery please call [phone].      ____________________________   ____________________________  Signature (Patient)                                 Signature (Nurse)                     Date

## 2023-09-22 LAB
MRSA, DNA, NASAL: NEGATIVE
SPECIMEN DESCRIPTION: NORMAL

## 2023-09-23 LAB
EKG ATRIAL RATE: 68 BPM
EKG P AXIS: 37 DEGREES
EKG P-R INTERVAL: 158 MS
EKG Q-T INTERVAL: 390 MS
EKG QRS DURATION: 80 MS
EKG QTC CALCULATION (BAZETT): 414 MS
EKG R AXIS: -35 DEGREES
EKG T AXIS: 44 DEGREES
EKG VENTRICULAR RATE: 68 BPM

## 2023-09-23 PROCEDURE — 93010 ELECTROCARDIOGRAM REPORT: CPT | Performed by: INTERNAL MEDICINE

## 2023-09-25 NOTE — PROGRESS NOTES
[facility] ([facility]) Joint Replacement Pre-surgical Assessment    Scheduled Surgery Date: 10/18/2023  Surgery Time: 0730    Surgeon: Effie Rogers  Procedure: left Total Knee    Primary Insurance Coverage [employer] CHOICE  [facility]  Pre-op class attended YES 0930    PCP: Windy Laws,   Clearance received by PCP: Yes    Anticipated Discharge Plan: home  Agency (if applicable): OPPT    Significant PMH:   Surgical History    Procedure Laterality Date Comment Source   APPENDECTOMY       CATARACT EXTRACTION       HERNIA REPAIR N/A 05/14/2020 HERNIA INCISIONAL  REPAIR LAPAROSCOPIC ROBOTIC XI WITH MESH performed by Mina Orozco MD at [address] [place] [address]  10/23/2009     [place]     WRIST SURGERY Right 2017       ED Notes    ED Notes    Medical History    Diagnosis Date Comment Source   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 Smoking history: none    Alcohol history: Never drinks    Concerns prior to surgery: PT MET WITH OT AFTER CLASS, PT HAS A FWW.     Electronically signed by: Sangita Duran RN on 9/25/2023 at 9:30 AM

## 2023-10-18 ENCOUNTER — APPOINTMENT (OUTPATIENT)
Dept: GENERAL RADIOLOGY | Age: 78
End: 2023-10-18
Attending: ORTHOPAEDIC SURGERY
Payer: MEDICARE

## 2023-10-18 ENCOUNTER — ANESTHESIA EVENT (OUTPATIENT)
Dept: OPERATING ROOM | Age: 78
End: 2023-10-18
Payer: MEDICARE

## 2023-10-18 ENCOUNTER — HOSPITAL ENCOUNTER (OUTPATIENT)
Age: 78
Discharge: HOME | End: 2023-10-18
Attending: ORTHOPAEDIC SURGERY | Admitting: ORTHOPAEDIC SURGERY
Payer: MEDICARE

## 2023-10-18 ENCOUNTER — ANESTHESIA (OUTPATIENT)
Dept: OPERATING ROOM | Age: 78
End: 2023-10-18
Payer: MEDICARE

## 2023-10-18 VITALS
SYSTOLIC BLOOD PRESSURE: 132 MMHG | RESPIRATION RATE: 16 BRPM | DIASTOLIC BLOOD PRESSURE: 72 MMHG | TEMPERATURE: 97.5 F | OXYGEN SATURATION: 92 % | BODY MASS INDEX: 37.4 KG/M2 | HEART RATE: 61 BPM | WEIGHT: 246 LBS

## 2023-10-18 PROBLEM — M17.12 PRIMARY OSTEOARTHRITIS OF LEFT KNEE: Chronic | Status: ACTIVE | Noted: 2023-10-18

## 2023-10-18 PROBLEM — E66.9 OBESITY (BMI 35.0-39.9 WITHOUT COMORBIDITY): Status: ACTIVE | Noted: 2023-10-18

## 2023-10-18 PROBLEM — M17.12 PRIMARY OSTEOARTHRITIS OF LEFT KNEE: Status: ACTIVE | Noted: 2023-10-18

## 2023-10-18 PROBLEM — E78.2 MIXED HYPERLIPIDEMIA: Status: ACTIVE | Noted: 2023-02-24

## 2023-10-18 PROBLEM — M17.12 PRIMARY OSTEOARTHRITIS OF LEFT KNEE: Chronic | Status: RESOLVED | Noted: 2023-10-18 | Resolved: 2023-10-18

## 2023-10-18 PROBLEM — I10 PRIMARY HYPERTENSION: Status: ACTIVE | Noted: 2023-02-24

## 2023-10-18 PROCEDURE — 73560 X-RAY EXAM OF KNEE 1 OR 2: CPT

## 2023-10-18 PROCEDURE — 7100000000 HC PACU RECOVERY - FIRST 15 MIN: Performed by: ORTHOPAEDIC SURGERY

## 2023-10-18 PROCEDURE — 6360000002 HC RX W HCPCS: Performed by: ORTHOPAEDIC SURGERY

## 2023-10-18 PROCEDURE — 97530 THERAPEUTIC ACTIVITIES: CPT

## 2023-10-18 PROCEDURE — C1776 JOINT DEVICE (IMPLANTABLE): HCPCS | Performed by: ORTHOPAEDIC SURGERY

## 2023-10-18 PROCEDURE — 6370000000 HC RX 637 (ALT 250 FOR IP): Performed by: ANESTHESIOLOGY

## 2023-10-18 PROCEDURE — C1713 ANCHOR/SCREW BN/BN,TIS/BN: HCPCS | Performed by: ORTHOPAEDIC SURGERY

## 2023-10-18 PROCEDURE — 7100000001 HC PACU RECOVERY - ADDTL 15 MIN: Performed by: ORTHOPAEDIC SURGERY

## 2023-10-18 PROCEDURE — 97535 SELF CARE MNGMENT TRAINING: CPT

## 2023-10-18 PROCEDURE — 2580000003 HC RX 258: Performed by: ORTHOPAEDIC SURGERY

## 2023-10-18 PROCEDURE — 64447 NJX AA&/STRD FEMORAL NRV IMG: CPT | Performed by: ANESTHESIOLOGY

## 2023-10-18 PROCEDURE — 97110 THERAPEUTIC EXERCISES: CPT

## 2023-10-18 PROCEDURE — 2580000003 HC RX 258: Performed by: ANESTHESIOLOGY

## 2023-10-18 PROCEDURE — 2500000003 HC RX 250 WO HCPCS: Performed by: ANESTHESIOLOGY

## 2023-10-18 PROCEDURE — 64450 NJX AA&/STRD OTHER PN/BRANCH: CPT | Performed by: ANESTHESIOLOGY

## 2023-10-18 PROCEDURE — A4217 STERILE WATER/SALINE, 500 ML: HCPCS | Performed by: ORTHOPAEDIC SURGERY

## 2023-10-18 PROCEDURE — 3600000005 HC SURGERY LEVEL 5 BASE: Performed by: ORTHOPAEDIC SURGERY

## 2023-10-18 PROCEDURE — 6360000002 HC RX W HCPCS: Performed by: ANESTHESIOLOGY

## 2023-10-18 PROCEDURE — 3700000001 HC ADD 15 MINUTES (ANESTHESIA): Performed by: ORTHOPAEDIC SURGERY

## 2023-10-18 PROCEDURE — 6370000000 HC RX 637 (ALT 250 FOR IP): Performed by: ORTHOPAEDIC SURGERY

## 2023-10-18 PROCEDURE — 97166 OT EVAL MOD COMPLEX 45 MIN: CPT

## 2023-10-18 PROCEDURE — 3700000000 HC ANESTHESIA ATTENDED CARE: Performed by: ORTHOPAEDIC SURGERY

## 2023-10-18 PROCEDURE — 2500000003 HC RX 250 WO HCPCS: Performed by: SPECIALIST

## 2023-10-18 PROCEDURE — 97162 PT EVAL MOD COMPLEX 30 MIN: CPT

## 2023-10-18 PROCEDURE — 2709999900 HC NON-CHARGEABLE SUPPLY: Performed by: ORTHOPAEDIC SURGERY

## 2023-10-18 PROCEDURE — 6360000002 HC RX W HCPCS: Performed by: SPECIALIST

## 2023-10-18 PROCEDURE — 97116 GAIT TRAINING THERAPY: CPT

## 2023-10-18 PROCEDURE — 2720000010 HC SURG SUPPLY STERILE: Performed by: ORTHOPAEDIC SURGERY

## 2023-10-18 PROCEDURE — C9290 INJ, BUPIVACAINE LIPOSOME: HCPCS | Performed by: ANESTHESIOLOGY

## 2023-10-18 PROCEDURE — 3600000015 HC SURGERY LEVEL 5 ADDTL 15MIN: Performed by: ORTHOPAEDIC SURGERY

## 2023-10-18 DEVICE — BEARING TIB FIX 7 KNEE BASE POROUS ATTUNE AFFIXIUM: Type: IMPLANTABLE DEVICE | Site: KNEE | Status: FUNCTIONAL

## 2023-10-18 DEVICE — CEMENT BNE 20GM HALF DOSE PMMA VISC RADPQ FAST: Type: IMPLANTABLE DEVICE | Site: KNEE | Status: FUNCTIONAL

## 2023-10-18 DEVICE — COMPONENT PAT DIA35MM KNEE POLY CEM MEDIALIZED ANAT ATTUNE: Type: IMPLANTABLE DEVICE | Site: KNEE | Status: FUNCTIONAL

## 2023-10-18 DEVICE — IMPLANTABLE DEVICE: Type: IMPLANTABLE DEVICE | Site: KNEE | Status: FUNCTIONAL

## 2023-10-18 DEVICE — IMPLANTABLE DEVICE: Type: IMPLANTABLE DEVICE | Status: FUNCTIONAL

## 2023-10-18 RX ORDER — GABAPENTIN 300 MG/1
300 CAPSULE ORAL ONCE
Status: COMPLETED | OUTPATIENT
Start: 2023-10-18 | End: 2023-10-18

## 2023-10-18 RX ORDER — LIDOCAINE HYDROCHLORIDE 10 MG/ML
INJECTION, SOLUTION INFILTRATION; PERINEURAL PRN
Status: DISCONTINUED | OUTPATIENT
Start: 2023-10-18 | End: 2023-10-18 | Stop reason: SDUPTHER

## 2023-10-18 RX ORDER — SODIUM CHLORIDE 0.9 % (FLUSH) 0.9 %
5-40 SYRINGE (ML) INJECTION EVERY 12 HOURS SCHEDULED
Status: DISCONTINUED | OUTPATIENT
Start: 2023-10-18 | End: 2023-10-18 | Stop reason: HOSPADM

## 2023-10-18 RX ORDER — ONDANSETRON 4 MG/1
4 TABLET, ORALLY DISINTEGRATING ORAL EVERY 8 HOURS PRN
Status: DISCONTINUED | OUTPATIENT
Start: 2023-10-18 | End: 2023-10-18 | Stop reason: HOSPADM

## 2023-10-18 RX ORDER — PROPOFOL 10 MG/ML
INJECTION, EMULSION INTRAVENOUS PRN
Status: DISCONTINUED | OUTPATIENT
Start: 2023-10-18 | End: 2023-10-18 | Stop reason: SDUPTHER

## 2023-10-18 RX ORDER — KETAMINE HCL IN NACL, ISO-OSM 100MG/10ML
SYRINGE (ML) INJECTION PRN
Status: DISCONTINUED | OUTPATIENT
Start: 2023-10-18 | End: 2023-10-18 | Stop reason: SDUPTHER

## 2023-10-18 RX ORDER — DEXAMETHASONE SODIUM PHOSPHATE 4 MG/ML
INJECTION, SOLUTION INTRA-ARTICULAR; INTRALESIONAL; INTRAMUSCULAR; INTRAVENOUS; SOFT TISSUE PRN
Status: DISCONTINUED | OUTPATIENT
Start: 2023-10-18 | End: 2023-10-18 | Stop reason: SDUPTHER

## 2023-10-18 RX ORDER — FENTANYL CITRATE 50 UG/ML
25 INJECTION, SOLUTION INTRAMUSCULAR; INTRAVENOUS EVERY 5 MIN PRN
Status: DISCONTINUED | OUTPATIENT
Start: 2023-10-18 | End: 2023-10-18 | Stop reason: HOSPADM

## 2023-10-18 RX ORDER — SODIUM CHLORIDE 9 MG/ML
INJECTION, SOLUTION INTRAVENOUS PRN
Status: DISCONTINUED | OUTPATIENT
Start: 2023-10-18 | End: 2023-10-18 | Stop reason: HOSPADM

## 2023-10-18 RX ORDER — ONDANSETRON 2 MG/ML
4 INJECTION INTRAMUSCULAR; INTRAVENOUS EVERY 6 HOURS PRN
Status: DISCONTINUED | OUTPATIENT
Start: 2023-10-18 | End: 2023-10-18 | Stop reason: HOSPADM

## 2023-10-18 RX ORDER — EPHEDRINE SULFATE/0.9% NACL/PF 50 MG/5 ML
SYRINGE (ML) INTRAVENOUS PRN
Status: DISCONTINUED | OUTPATIENT
Start: 2023-10-18 | End: 2023-10-18 | Stop reason: SDUPTHER

## 2023-10-18 RX ORDER — SODIUM CHLORIDE 0.9 % (FLUSH) 0.9 %
5-40 SYRINGE (ML) INJECTION PRN
Status: DISCONTINUED | OUTPATIENT
Start: 2023-10-18 | End: 2023-10-18 | Stop reason: HOSPADM

## 2023-10-18 RX ORDER — OXYCODONE HYDROCHLORIDE 5 MG/1
5 TABLET ORAL EVERY 4 HOURS PRN
Status: DISCONTINUED | OUTPATIENT
Start: 2023-10-18 | End: 2023-10-18 | Stop reason: HOSPADM

## 2023-10-18 RX ORDER — OXYCODONE HYDROCHLORIDE 5 MG/1
10 TABLET ORAL EVERY 4 HOURS PRN
Status: DISCONTINUED | OUTPATIENT
Start: 2023-10-18 | End: 2023-10-18 | Stop reason: HOSPADM

## 2023-10-18 RX ORDER — KETOROLAC TROMETHAMINE 15 MG/ML
15 INJECTION, SOLUTION INTRAMUSCULAR; INTRAVENOUS EVERY 6 HOURS
Status: DISCONTINUED | OUTPATIENT
Start: 2023-10-18 | End: 2023-10-18 | Stop reason: HOSPADM

## 2023-10-18 RX ORDER — ONDANSETRON 2 MG/ML
4 INJECTION INTRAMUSCULAR; INTRAVENOUS
Status: DISCONTINUED | OUTPATIENT
Start: 2023-10-18 | End: 2023-10-18 | Stop reason: HOSPADM

## 2023-10-18 RX ORDER — LIDOCAINE HYDROCHLORIDE 10 MG/ML
1 INJECTION, SOLUTION EPIDURAL; INFILTRATION; INTRACAUDAL; PERINEURAL
Status: DISCONTINUED | OUTPATIENT
Start: 2023-10-18 | End: 2023-10-18 | Stop reason: HOSPADM

## 2023-10-18 RX ORDER — FAMOTIDINE 20 MG/1
20 TABLET, FILM COATED ORAL 2 TIMES DAILY
Status: DISCONTINUED | OUTPATIENT
Start: 2023-10-18 | End: 2023-10-18 | Stop reason: HOSPADM

## 2023-10-18 RX ORDER — TRANEXAMIC ACID 100 MG/ML
INJECTION, SOLUTION INTRAVENOUS PRN
Status: DISCONTINUED | OUTPATIENT
Start: 2023-10-18 | End: 2023-10-18 | Stop reason: SDUPTHER

## 2023-10-18 RX ORDER — SODIUM CHLORIDE, SODIUM LACTATE, POTASSIUM CHLORIDE, CALCIUM CHLORIDE 600; 310; 30; 20 MG/100ML; MG/100ML; MG/100ML; MG/100ML
INJECTION, SOLUTION INTRAVENOUS CONTINUOUS
Status: DISCONTINUED | OUTPATIENT
Start: 2023-10-18 | End: 2023-10-18 | Stop reason: HOSPADM

## 2023-10-18 RX ORDER — ACETAMINOPHEN 500 MG
1000 TABLET ORAL ONCE
Status: COMPLETED | OUTPATIENT
Start: 2023-10-18 | End: 2023-10-18

## 2023-10-18 RX ORDER — SODIUM CHLORIDE 9 MG/ML
INJECTION, SOLUTION INTRAVENOUS CONTINUOUS
Status: DISCONTINUED | OUTPATIENT
Start: 2023-10-18 | End: 2023-10-18 | Stop reason: HOSPADM

## 2023-10-18 RX ORDER — ROCURONIUM BROMIDE 10 MG/ML
INJECTION, SOLUTION INTRAVENOUS PRN
Status: DISCONTINUED | OUTPATIENT
Start: 2023-10-18 | End: 2023-10-18 | Stop reason: SDUPTHER

## 2023-10-18 RX ORDER — MIDAZOLAM HYDROCHLORIDE 1 MG/ML
2 INJECTION INTRAMUSCULAR; INTRAVENOUS ONCE
Status: COMPLETED | OUTPATIENT
Start: 2023-10-18 | End: 2023-10-18

## 2023-10-18 RX ORDER — LIDOCAINE HYDROCHLORIDE 20 MG/ML
INJECTION, SOLUTION EPIDURAL; INFILTRATION; INTRACAUDAL; PERINEURAL PRN
Status: DISCONTINUED | OUTPATIENT
Start: 2023-10-18 | End: 2023-10-18 | Stop reason: SDUPTHER

## 2023-10-18 RX ORDER — BUPIVACAINE HYDROCHLORIDE 2.5 MG/ML
INJECTION, SOLUTION EPIDURAL; INFILTRATION; INTRACAUDAL PRN
Status: DISCONTINUED | OUTPATIENT
Start: 2023-10-18 | End: 2023-10-18 | Stop reason: SDUPTHER

## 2023-10-18 RX ORDER — MAGNESIUM HYDROXIDE 1200 MG/15ML
LIQUID ORAL CONTINUOUS PRN
Status: COMPLETED | OUTPATIENT
Start: 2023-10-18 | End: 2023-10-18

## 2023-10-18 RX ORDER — MORPHINE SULFATE 2 MG/ML
2 INJECTION, SOLUTION INTRAMUSCULAR; INTRAVENOUS
Status: DISCONTINUED | OUTPATIENT
Start: 2023-10-18 | End: 2023-10-18 | Stop reason: HOSPADM

## 2023-10-18 RX ORDER — TRANEXAMIC ACID 100 MG/ML
INJECTION, SOLUTION INTRAVENOUS
Status: COMPLETED
Start: 2023-10-18 | End: 2023-10-18

## 2023-10-18 RX ORDER — CELECOXIB 200 MG/1
200 CAPSULE ORAL ONCE
Status: COMPLETED | OUTPATIENT
Start: 2023-10-18 | End: 2023-10-18

## 2023-10-18 RX ORDER — KETOROLAC TROMETHAMINE 30 MG/ML
15 INJECTION, SOLUTION INTRAMUSCULAR; INTRAVENOUS EVERY 6 HOURS
Status: DISCONTINUED | OUTPATIENT
Start: 2023-10-18 | End: 2023-10-18 | Stop reason: CLARIF

## 2023-10-18 RX ORDER — FENTANYL CITRATE 50 UG/ML
INJECTION, SOLUTION INTRAMUSCULAR; INTRAVENOUS PRN
Status: DISCONTINUED | OUTPATIENT
Start: 2023-10-18 | End: 2023-10-18 | Stop reason: SDUPTHER

## 2023-10-18 RX ORDER — HYDROMORPHONE HYDROCHLORIDE 1 MG/ML
0.5 INJECTION, SOLUTION INTRAMUSCULAR; INTRAVENOUS; SUBCUTANEOUS EVERY 5 MIN PRN
Status: DISCONTINUED | OUTPATIENT
Start: 2023-10-18 | End: 2023-10-18 | Stop reason: HOSPADM

## 2023-10-18 RX ORDER — DEXAMETHASONE SODIUM PHOSPHATE 10 MG/ML
INJECTION, SOLUTION INTRAMUSCULAR; INTRAVENOUS PRN
Status: DISCONTINUED | OUTPATIENT
Start: 2023-10-18 | End: 2023-10-18 | Stop reason: SDUPTHER

## 2023-10-18 RX ORDER — MORPHINE SULFATE 4 MG/ML
4 INJECTION, SOLUTION INTRAMUSCULAR; INTRAVENOUS
Status: DISCONTINUED | OUTPATIENT
Start: 2023-10-18 | End: 2023-10-18 | Stop reason: HOSPADM

## 2023-10-18 RX ORDER — ONDANSETRON 2 MG/ML
INJECTION INTRAMUSCULAR; INTRAVENOUS PRN
Status: DISCONTINUED | OUTPATIENT
Start: 2023-10-18 | End: 2023-10-18 | Stop reason: SDUPTHER

## 2023-10-18 RX ORDER — ACETAMINOPHEN 325 MG/1
650 TABLET ORAL EVERY 6 HOURS
Status: DISCONTINUED | OUTPATIENT
Start: 2023-10-18 | End: 2023-10-18 | Stop reason: HOSPADM

## 2023-10-18 RX ORDER — TAMSULOSIN HYDROCHLORIDE 0.4 MG/1
0.4 CAPSULE ORAL ONCE
Status: COMPLETED | OUTPATIENT
Start: 2023-10-18 | End: 2023-10-18

## 2023-10-18 RX ADMIN — BUPIVACAINE HYDROCHLORIDE 20 ML: 2.5 INJECTION, SOLUTION EPIDURAL; INFILTRATION; INTRACAUDAL; PERINEURAL at 07:07

## 2023-10-18 RX ADMIN — Medication 10 MG: at 07:47

## 2023-10-18 RX ADMIN — KETOROLAC TROMETHAMINE 15 MG: 15 INJECTION, SOLUTION INTRAMUSCULAR; INTRAVENOUS at 12:55

## 2023-10-18 RX ADMIN — Medication 10 MG: at 09:31

## 2023-10-18 RX ADMIN — GABAPENTIN 300 MG: 300 CAPSULE ORAL at 06:46

## 2023-10-18 RX ADMIN — TRANEXAMIC ACID 1000 MG: 100 INJECTION, SOLUTION INTRAVENOUS at 07:39

## 2023-10-18 RX ADMIN — TAMSULOSIN HYDROCHLORIDE 0.4 MG: 0.4 CAPSULE ORAL at 11:57

## 2023-10-18 RX ADMIN — BUPIVACAINE 10 ML: 13.3 INJECTION, SUSPENSION, LIPOSOMAL INFILTRATION at 07:07

## 2023-10-18 RX ADMIN — PROPOFOL 130 MG: 10 INJECTION, EMULSION INTRAVENOUS at 07:29

## 2023-10-18 RX ADMIN — SUGAMMADEX 200 MG: 100 INJECTION, SOLUTION INTRAVENOUS at 09:25

## 2023-10-18 RX ADMIN — Medication 50 MG: at 08:42

## 2023-10-18 RX ADMIN — Medication 10 MG: at 09:05

## 2023-10-18 RX ADMIN — BUPIVACAINE HYDROCHLORIDE 15 ML: 2.5 INJECTION, SOLUTION EPIDURAL; INFILTRATION; INTRACAUDAL; PERINEURAL at 07:10

## 2023-10-18 RX ADMIN — LIDOCAINE HYDROCHLORIDE 5 ML: 10 INJECTION, SOLUTION INFILTRATION; PERINEURAL at 07:10

## 2023-10-18 RX ADMIN — SODIUM CHLORIDE, POTASSIUM CHLORIDE, SODIUM LACTATE AND CALCIUM CHLORIDE: 600; 310; 30; 20 INJECTION, SOLUTION INTRAVENOUS at 06:50

## 2023-10-18 RX ADMIN — FENTANYL CITRATE 100 MCG: 50 INJECTION INTRAMUSCULAR; INTRAVENOUS at 07:29

## 2023-10-18 RX ADMIN — Medication 10 MG: at 08:07

## 2023-10-18 RX ADMIN — SODIUM CHLORIDE: 9 INJECTION, SOLUTION INTRAVENOUS at 11:59

## 2023-10-18 RX ADMIN — KETOROLAC TROMETHAMINE 15 MG: 30 INJECTION, SOLUTION INTRAMUSCULAR at 10:27

## 2023-10-18 RX ADMIN — MIDAZOLAM 2 MG: 1 INJECTION INTRAMUSCULAR; INTRAVENOUS at 07:05

## 2023-10-18 RX ADMIN — ACETAMINOPHEN 650 MG: 325 TABLET ORAL at 12:55

## 2023-10-18 RX ADMIN — LIDOCAINE HYDROCHLORIDE 60 MG: 20 INJECTION, SOLUTION EPIDURAL; INFILTRATION; INTRACAUDAL; PERINEURAL at 07:29

## 2023-10-18 RX ADMIN — SODIUM CHLORIDE, POTASSIUM CHLORIDE, SODIUM LACTATE AND CALCIUM CHLORIDE: 600; 310; 30; 20 INJECTION, SOLUTION INTRAVENOUS at 13:04

## 2023-10-18 RX ADMIN — FAMOTIDINE 20 MG: 20 TABLET ORAL at 11:57

## 2023-10-18 RX ADMIN — DEXAMETHASONE SODIUM PHOSPHATE 10 MG: 10 INJECTION, SOLUTION INTRAMUSCULAR; INTRAVENOUS at 07:36

## 2023-10-18 RX ADMIN — ROCURONIUM BROMIDE 50 MG: 10 INJECTION, SOLUTION INTRAVENOUS at 07:29

## 2023-10-18 RX ADMIN — CELECOXIB 200 MG: 200 CAPSULE ORAL at 06:46

## 2023-10-18 RX ADMIN — FENTANYL CITRATE 25 MCG: 50 INJECTION, SOLUTION INTRAMUSCULAR; INTRAVENOUS at 10:30

## 2023-10-18 RX ADMIN — Medication 10 MG: at 07:48

## 2023-10-18 RX ADMIN — SODIUM CHLORIDE, POTASSIUM CHLORIDE, SODIUM LACTATE AND CALCIUM CHLORIDE: 600; 310; 30; 20 INJECTION, SOLUTION INTRAVENOUS at 08:21

## 2023-10-18 RX ADMIN — DEXAMETHASONE SODIUM PHOSPHATE 4 MG: 4 INJECTION, SOLUTION INTRAMUSCULAR; INTRAVENOUS at 07:10

## 2023-10-18 RX ADMIN — Medication 2000 MG: at 07:34

## 2023-10-18 RX ADMIN — TRANEXAMIC ACID 1000 MG: 100 INJECTION, SOLUTION INTRAVENOUS at 08:59

## 2023-10-18 RX ADMIN — LIDOCAINE HYDROCHLORIDE 3 ML: 10 INJECTION, SOLUTION INFILTRATION; PERINEURAL at 07:07

## 2023-10-18 RX ADMIN — ACETAMINOPHEN 1000 MG: 500 TABLET ORAL at 06:45

## 2023-10-18 RX ADMIN — ONDANSETRON 4 MG: 2 INJECTION INTRAMUSCULAR; INTRAVENOUS at 09:25

## 2023-10-18 ASSESSMENT — PAIN DESCRIPTION - LOCATION
LOCATION: KNEE

## 2023-10-18 ASSESSMENT — PAIN SCALES - GENERAL
PAINLEVEL_OUTOF10: 3
PAINLEVEL_OUTOF10: 3
PAINLEVEL_OUTOF10: 5
PAINLEVEL_OUTOF10: 2
PAINLEVEL_OUTOF10: 5

## 2023-10-18 ASSESSMENT — PAIN DESCRIPTION - ORIENTATION
ORIENTATION: LEFT

## 2023-10-18 ASSESSMENT — ENCOUNTER SYMPTOMS
WHEEZING: 0
SHORTNESS OF BREATH: 0
ABDOMINAL PAIN: 0
COUGH: 0
VOMITING: 0
DIARRHEA: 0
CONSTIPATION: 0
SINUS PAIN: 0
PHOTOPHOBIA: 0
NAUSEA: 0
SINUS PRESSURE: 0

## 2023-10-18 ASSESSMENT — PAIN DESCRIPTION - PAIN TYPE
TYPE: SURGICAL PAIN
TYPE: SURGICAL PAIN

## 2023-10-18 ASSESSMENT — PAIN DESCRIPTION - FREQUENCY
FREQUENCY: CONTINUOUS
FREQUENCY: CONTINUOUS

## 2023-10-18 ASSESSMENT — PAIN DESCRIPTION - DESCRIPTORS: DESCRIPTORS: HEAVINESS

## 2023-10-18 ASSESSMENT — PAIN - FUNCTIONAL ASSESSMENT
PAIN_FUNCTIONAL_ASSESSMENT: PREVENTS OR INTERFERES SOME ACTIVE ACTIVITIES AND ADLS
PAIN_FUNCTIONAL_ASSESSMENT: 0-10

## 2023-10-18 ASSESSMENT — PAIN DESCRIPTION - ONSET: ONSET: ON-GOING

## 2023-10-18 NOTE — BRIEF OP NOTE
Brief Postoperative Note      Patient: Eric Spring  YOB: 1945  MRN: [id number]    Date of Procedure: 10/18/2023    Pre-Op Diagnosis Codes:     * Primary osteoarthritis of left knee [M17.12]    Post-Op Diagnosis: Same       Procedure(s):  LEFT KNEE TOTAL ARTHROPLASTY    Surgeon(s): Sondra Mantilla MD    Assistant:  Surgical Assistant: Cole Ortiz RN  First Assistant: Radha Real    Anesthesia: General    Estimated Blood Loss (mL): 432     Complications: None    Specimens:   * No specimens in log *    Implants:  Implant Name Type Inv.  Item Serial No.  Lot No. LRB No. Used Action   CEMENT BNE 20GM HALF DOSE PMMA VISC RADPQ FAST - [id number]  CEMENT BNE 20GM HALF DOSE PMMA VISC RADPQ FAST  [facility] [facility] [id number] Left 1 Implanted   SYSTEM KNEE SZ 7 LT FEM PORCOAT CRUC RET CEMENTLESS ATTUNE - [id number]  SYSTEM KNEE SZ 7 LT FEM PORCOAT CRUC RET CEMENTLESS ATTUNE  [facility] [employer] [facility] [id number] Left 1 Implanted   BEARING TIB FIX 7 KNEE BASE POROUS ATTUNE AFFIXIUM - [id number]  BEARING TIB FIX 7 KNEE BASE POROUS ATTUNE AFFIXIUM  [facility] [employer] [facility] [id number] Left 1 Implanted   COMPONENT PAT [id number] KNEE POLY SATISH MEDIALIZED VENU ATTUNE - [id number]  COMPONENT PAT [id number] KNEE POLY SATISH MEDIALIZED VENU ATTUNE  [facility] [employer] [facility] [id number] Left 1 Implanted   INSERT TIB FIX BEAR 7 7 MM LT MEDL KNEE STBL AOX ATTUNE - [id number]  INSERT TIB FIX BEAR 7 7 MM LT MEDL KNEE STBL AOX ATTUNE  [facility] [employer] [facility] [id number] Left 1 Implanted         Drains: * No LDAs found *    Findings: Severe OA with recurvatum and instability, left knee      Electronically signed by Yina Do MD on 10/18/2023 at 10:07 AM

## 2023-10-18 NOTE — ANESTHESIA PROCEDURE NOTES
Peripheral Block    Patient location during procedure: pre-op  Reason for block: post-op pain management and at surgeon's request  Start time: 10/18/2023 7:03 AM  End time: 10/18/2023 7:08 AM  Staffing  Performed: anesthesiologist   Anesthesiologist: Issac Whyte DO  Performed by: Issac Whyte DO  Authorized by: Issac Whyte DO    Preanesthetic Checklist  Completed: patient identified, IV checked, site marked, risks and benefits discussed, surgical/procedural consents, equipment checked, pre-op evaluation, timeout performed, anesthesia consent given, oxygen available and monitors applied/VS acknowledged  Peripheral Block   Patient position: supine  Prep: ChloraPrep  Provider prep: mask and sterile gloves  Patient monitoring: cardiac monitor, continuous pulse ox, frequent blood pressure checks, IV access, oxygen and responsive to questions  Block type: Femoral  Adductor canal  Laterality: left  Injection technique: single-shot  Guidance: ultrasound guided  Local infiltration: lidocaine  Infiltration strength: 1 %  Local infiltration: lidocaine  Dose: 3 mL    Needle   Needle type: insulated echogenic nerve stimulator needle   Needle gauge: 21 G  Needle localization: ultrasound guidance  Needle length: 10 cm  Assessment   Injection assessment: negative aspiration for heme, no paresthesia on injection and local visualized surrounding nerve on ultrasound  Paresthesia pain: none  Slow fractionated injection: yes  Hemodynamics: stable  Real-time US image taken/store: yes  Outcomes: uncomplicated    Additional Notes  Left adductor canal single shot   10ml 1.3% exparel + 20 ml 0.25% bupivacaine

## 2023-10-18 NOTE — CONSULTS
[facility]  Office: [address], DO, Ahsan Spangler, DO, Eva Dial, DO, Kashif Bridges Blood, DO, Remy Costa MD, Hemalatha Shahid MD, Raheel Rico MD, Dee Dee Silvestre MD,  Christ Ruiz MD, Mandie Carroll MD, Crissy Hoskins, DO, Jhonatan Burgos MD,  Levi Max MD, Jaspal Yanez MD, Markel Kennedy, DO, Ayleen More MD,  Kenny Girard, DO, Michel Garcia MD, Saranya Sutton MD, Jules Mclean MD, Jose A Moise MD,  Reyes Sims MD, Diego Park MD, Nick Boyd MD, Korey Boyce MD, Epi Ryan, DO, Loree Polanco MD,  Ton Gipson MD, Francisca Moreno MD, Lio Gatica, CNP,  David Durant, CNP,, Savanna Mcqueen, CNP,  Ricco Myers, [facility], Norberto Alfredo, CNP, Janette Dee, CNP, Rodo Peoples, CNP, Toshia Polanco, CNP, Torri Brooke, CNP, Keren Osorio, CNP, Tabitha Caraballo, CNS, Francoise Del Rio, CNP, Maddison Bradford, [address] / HISTORY AND PHYSICAL EXAMINATION            Date:   10/18/2023  Patient name:  Richy Willett  Date of admission:  10/18/2023  5:43 AM  MRN:   [id number]  Account:  [de-identified]  YOB: 1945  PCP:    Madelin Montalvo DO  Room:   2004/2004-02  Code Status:    Full Code    Physician Requesting Consult: Toni Falcon MD    Reason for Consult: Postop medical management    Chief Complaint:     Postoperative discomfort    History Obtained From:     patient    History of Present Illness:     Patient initially got dizzy with first attempt with therapy. Patient has not eaten or drank anything since last night. Therapy advised he will retry again this afternoon. If this goes well he can be discharged without objection from internal medicine.     Past Medical History:     Past Medical History:   Diagnosis Date    Arthritis     in wrists    Cancer ([address]) 10/23/2009    PROSTATE- had radiation treatments started 2010 for 7

## 2023-10-18 NOTE — H&P
place, and time. Psychiatric:   Behavior: Behavior normal. Behavior is cooperative. Thought Content: Thought content normal.   Judgment: Judgment normal.     Recent Laboratory Work and Imaging    Lab Results   Component Value Date   GLU 97 02/24/2023   CALCIUM 9.2 02/24/2023   K 4.2 02/24/2023   CO2 23 02/24/2023    (H) 02/24/2023   BUN 23 02/24/2023   CREATININE 1.07 02/24/2023     Lab Results   Component Value Date   HGBA1C 5.5 01/05/2023     Predictors of intubation difficulty:  Morbid obesity? no  Anatomically abnormal facies? no  Short, thick neck? no  Neck range of motion: normal  Mallampati score: III  Dentition: No chipped, loose, or missing teeth. Pre-op testing:  ECG: We did not have a copy of the EKG done recently. That said it was read:  Normal sinus rhythm   Left axis deviation   Abnormal ECG   When compared with ECG of 27-APR-2020 09:42,   No significant change was found     Assessment and Plan:   Sam Mckeon is a 70 y.o. male who has a past medical history of Allergic, Arthritis, Back pain, Cataract, Glaucoma, Hypertension, Obesity, Prostate cancer ([facility]), Varicella, and Visual impairment. here with [facility]    1. Preoperative clearance    2. Primary osteoarthritis of left knee  Patient plan to undergo total knee replacement here in a few weeks. Discussed risk    3. Primary hypertension    4. PHILIPPE (obstructive sleep apnea)      Patient Instructions   Thank you for coming in and discussing this with me. We discussed that overall your at an intermediate, average risk for an adverse event. We discussed making sure they are aware of your sleep apnea but otherwise best of luck with the surgery. Let me know how I can help but I will see you in January     Discussion:    Sam Mckeon is a 70 y.o. male who is to have knee replacement, an moderate risk procedure. Known risk factors for perioperative complications: None  HTN . Difficulty with intubation is not anticipated.  RCRI = 0,

## 2023-10-18 NOTE — ANESTHESIA POSTPROCEDURE EVALUATION
Department of Anesthesiology  Postprocedure Note    Patient: Ryan Covington  MRN: [id number]  YOB: 1945  Date of evaluation: 10/18/2023      Procedure Summary     Date: 10/18/23 Room / Location: [address] - INPATIENT    Anesthesia Start: 3785 Anesthesia Stop: 1011    Procedure: LEFT KNEE TOTAL ARTHROPLASTY (Left: Knee) Diagnosis:       Primary osteoarthritis of left knee      (Primary osteoarthritis of left knee [M17.12])    Surgeons: Charley Mantilla MD Responsible Provider: Aamir Galeano DO    Anesthesia Type: general ASA Status: 3          Anesthesia Type: No value filed.     Stefan Phase I: Stefan Score: 9    Stefan Phase II:        Anesthesia Post Evaluation    Patient location during evaluation: PACU  Patient participation: complete - patient participated  Level of consciousness: awake and alert  Airway patency: patent  Nausea & Vomiting: no nausea and no vomiting  Complications: no  Cardiovascular status: hemodynamically stable  Respiratory status: acceptable  Hydration status: stable  Pain management: adequate

## 2023-10-18 NOTE — OP NOTE
alternative treatments, I believe that all of their questions were answered to their satisfaction. Informed consent is now given to proceed with surgery as discussed. Procedure  After proper patient identification and marking of the surgical site in the preoperative area, and following update of the history and physical examination, the patient was brought to the operative suite and placed on the table in supine position. The patient was identified as Benedict Singh. An adequate anesthetic was induced. Next the lower extremity was prepped and draped free in the usual sterile fashion. After marking the skin, the knife was taken and the skin was incised in the anterior midline at the knee, deepening the incision through subcutaneous tissue to deep retinaculum. Minimal flap development was undertaken. A medial capsulotomy was made, not violating the quadriceps tendon, and the patella was tipped laterally to inspect the joint surfaces. At this time, meticulous hemostasis was undertaken. There was advanced tricompartmental degenerative change, warranting knee replacement as planned. Therefore, attention was first directed to the patella. The patellar articular surface was removed with a saw, and the patella was then sized for resurfacing and subluxated laterally. Attention was directed to the femur. The femoral cutting guide was used to resect the femur at the templated valgus angle and then the femoral guides were used to indicate the PF #7 size. Next the remaining anterior and posterior as well as chamfer cuts were made. The trial femoral component was then fitted and noted to seat adequately. Now attention was directed to the tibia. The tibial cutting guide was used to resect the tibial plateau at the templated posterior slope, a few millimeters below the major tibial defect. The tibial sizers were used to indicate the PF #7 size.  Now, with these trial femoral and tibial components in place, the knee was

## 2023-10-18 NOTE — PLAN OF CARE
Problem: Discharge Planning  Goal: Discharge to home or other facility with appropriate resources  Outcome: Adequate for Discharge  Flowsheets (Taken 10/18/2023 1221)  Discharge to home or other facility with appropriate resources:   Identify barriers to discharge with patient and caregiver   Identify discharge learning needs (meds, wound care, etc)   Refer to discharge planning if patient needs post-hospital services based on physician order or complex needs related to functional status, cognitive ability or social support system   Arrange for needed discharge resources and transportation as appropriate     Problem: Pain  Goal: Verbalizes/displays adequate comfort level or baseline comfort level  Outcome: Adequate for Discharge     Problem: Safety - Adult  Goal: Free from fall injury  Outcome: Adequate for Discharge     Problem: ABCDS Injury Assessment  Goal: Absence of physical injury  Outcome: Adequate for Discharge

## 2023-10-18 NOTE — PROGRESS NOTES
Faxed over order for FWW to [facility] and Doug Chun has FWW to deliver to pt.
Linda Trinidad was evaluated today and a DME order was entered for a wheeled walker because he requires this to successfully complete daily living tasks of ambulating. A wheeled walker is necessary due to the patient's unsteady gait, upper body weakness, and inability to  an ambulation device; and he can ambulate only by pushing a walker instead of a lesser assistive device such as a cane, crutch, or standard walker. The need for this equipment was discussed with the patient and he understands and is in agreement.
Orthopedic Coordinator Note    Patient s/p left total Knee replacement on 10/18/2023 WITH DR. Roxie Burch. The following appointments are currently scheduled:    Post-op with surgeon 11/02/2023 AT 1115. Physical Therapy PT LINK 10/20/2023. Wheeled walker order is not entered  Face to face documentation is N/A-PT HAS A FWW. DVT Prophylaxis: 10MG XARELTO DAILY X 21 DAYS. PT HAS [facility] AND Anthony Logan TO [place], [place].       Any questions please contact Rao Shepherd RN, MSN  [phone]      Electronically signed by: Rao Shepherd RN on 10/18/2023 at 8:24 AM
Patient ate and drank without any issues. Pain in control, worked with therapy, and voided. Stable for discharge. Valerio Gonsales NP notified.
Patient discharged via wheelchair to home with all his belongings in stable condition.  Patient understood and signed AVS.
Patient prepared for PNB. Patient connected to all monitoring equipment. Oxygen per nasal cannula at 2 liters. Time Out completed at 7:03 in the presence of 2 RN's and Dr. Tapan Pyle. Versed 2 mg IV administered at 7:05. Procedure start time 7:07. Procedure End Time 7:15. Patient tolerated procedure without difficulty. All safety measures implemented. SR up x2. Call light within reach. Bed in lowest lowest locked position. Family at bedside.
Pt admitted to room #2004 from PACU  Oriented to room and call light/tv controls. Bed in lowest position, wheels locked, 2/4 side rails up  Call light in reach, room free of clutter, adequate lighting provided.
non-reciprocal stair negotiation pattern s/p TKA, RW safety, and PT POC. Pt verbalized fair understanding. Pt & pt's family denying any questions/concerns upon writer's exit. Education Method: Demonstration;Verbal;Printed Information/Hand-outs  Education Outcome: Verbalized understanding      Therapy Time   First Session Second Session Group Co-treatment   Time In 0574  8750       Time Out 1476  4893       Minutes 43  43       Total Time: 86 minutes   Treatment time: 68 minutes     Co-treatment with OT warranted first time up day of surgery. Cotx due to potential risk of decreased sensation, muscle control and proprioception from spinal epidural and/or regional block. Decreased safety and independence requiring 2 skilled therapy professionals to address individual discipline's goals. Freya Uriarte, PT       Writer returned from [id number] this date to address further ambulation & stair negotiation prior to discharge home. Pt supine in bed upon arrival w/ spouse present at bedside. Pt performed sup>sit SBA requiring mild increase in time and use of bedrails for UE assist.  Pt denying any dizziness/lightheadedness throughout position changes. Assist required for safe line mgmt throughout. Pt performed multiple STS transfers throughout session this date requiring CGA & min verbal cueing for proper hand placement throughout transfers w/ RW w/ good return demo. Pt denying any dizziness/lightheadedness throughout position changes. While standing at EOB, pt performed pre-gait lateral weight shifting and standing marches to assess quad control prior to initiating ambulation w/ fair steadiness noted throughout. Pt then ambulated 30ft x 2 w/ RW CGA. Pt requiring min verbal cueing for proper gait sequence w/ RW post-op w/ fair return demo. Throughout session, pt also performed stair negotiation over platform step. Pt ascended/descend platform step twice w/ use of RW.   Min verbal cueing required for proper
platform step x2 with RW and Min A. Once seated back on EOB pt and spouse educated on ADL completion strategies (see ADL and EDU section for details). Pt completed sit to supine with SBA without difficulties. Pt resting bed comfortably with PT in room upon writers departure. RN notified.       Tomer Shannon, OT

## 2023-10-18 NOTE — DISCHARGE INSTRUCTIONS
ANY ORTHOPEDIC QUESTIONS OR ANY OTHER CONCERNS YOU MAY CALL THE ORTHOPEDIC COORDINATOR:  Jose Antonio Prater RN, MSN  [phone]  [email or web address]    DISCHARGE INSTRUCTIONS  Caring for yourself after joint replacement surgery (Total Hip and Total Knee Replacement)    Activity and Therapy  Receive physical therapy three times per week. (Pain medication one hour prior to therapy)   Perform PT exercises on own when not receiving home or outpatient PT. Ideally exercises should be at least two times a day. Increase level of activity and ambulation each day. Perform deep breathing exercises daily. Patient provides self-care when possible. Work on Range of motion for Total knee patients. No pillow under the knee for Total knee patients. Elevate the surgical leg when seated. No driving until cleared by surgeon      Diet:  Increase oral intake of fruits, fiber and water to prevent constipation. Drink fluids frequently and take stool softeners to aid in bowel motility. Increase protein intake/reduce high-sugar intake to help promote healing and prevent infection. Incision Care:  Keep Aquacel or other dressing intact until seen and removed by surgeon, unless saturated, in which case, call surgeon and request instructions. If dressing falls off, call surgeon. [facility] on in the am and off in the pm to reduce swelling. Ice affected area four times a day, for twenty minutes. Pain Medications and Anticoagulant  You have been place on an anticoagulant to prevent blood clots. Take this medication exactly as prescribed. Be alert for signs of bleeding. Take care not to injure yourself. You have been provided pain medicine to control your pain. Do not take more narcotics than prescribed. You may begin weaning from narcotics as your pain level improves by decreasing the amount or frequency of the narcotics. You may also take plain acetaminophen as an alternate to the narcotics.    Never exceed the

## 2023-10-18 NOTE — ANESTHESIA PROCEDURE NOTES
Peripheral Block    Patient location during procedure: pre-op  Reason for block: post-op pain management and at surgeon's request  Start time: 10/18/2023 7:08 AM  End time: 10/18/2023 7:15 AM  Staffing  Performed: anesthesiologist   Anesthesiologist: Noa Palomino DO  Performed by: Noa Palomino DO  Authorized by: Noa Palomino DO    Preanesthetic Checklist  Completed: patient identified, IV checked, site marked, risks and benefits discussed, surgical/procedural consents, equipment checked, pre-op evaluation, timeout performed, anesthesia consent given, oxygen available and monitors applied/VS acknowledged  Peripheral Block   Patient position: supine  Prep: ChloraPrep  Provider prep: mask and sterile gloves  Patient monitoring: cardiac monitor, continuous pulse ox, frequent blood pressure checks, IV access, responsive to questions and oxygen  Block type:  Anterior knee  Laterality: left  Injection technique: single-shot  Guidance: ultrasound guided  Local infiltration: lidocaine  Infiltration strength: 1 %  Local infiltration: lidocaine  Dose: 5 mL    Needle   Needle type: insulated echogenic nerve stimulator needle   Needle gauge: 21 G  Needle localization: ultrasound guidance  Needle length: 10 cm  Assessment   Injection assessment: negative aspiration for heme, no paresthesia on injection and local visualized surrounding nerve on ultrasound  Paresthesia pain: none  Slow fractionated injection: yes  Hemodynamics: stable  Real-time US image taken/store: yes  Outcomes: uncomplicated    Additional Notes  Left genicular nerve blocks  A total of 15 ml 0.25% bupiravaine + 4mg decadron was divided equally between the superomedial, superolateral, and inferomedial genicular nerves

## 2023-11-16 ENCOUNTER — APPOINTMENT (OUTPATIENT)
Dept: GENERAL RADIOLOGY | Age: 78
DRG: 909 | End: 2023-11-16
Payer: MEDICARE

## 2023-11-16 ENCOUNTER — HOSPITAL ENCOUNTER (INPATIENT)
Age: 78
LOS: 2 days | Discharge: HOME | DRG: 909 | End: 2023-11-18
Attending: EMERGENCY MEDICINE | Admitting: INTERNAL MEDICINE
Payer: MEDICARE

## 2023-11-16 DIAGNOSIS — S83.005A DISLOCATION OF LEFT PATELLA, INITIAL ENCOUNTER: ICD-10-CM

## 2023-11-16 DIAGNOSIS — T81.31XA DEHISCENCE OF OPERATIVE WOUND, INITIAL ENCOUNTER: Primary | ICD-10-CM

## 2023-11-16 PROBLEM — E66.01 CLASS 2 SEVERE OBESITY DUE TO EXCESS CALORIES WITH SERIOUS COMORBIDITY AND BODY MASS INDEX (BMI) OF 37.0 TO 37.9 IN ADULT (HCC): Status: ACTIVE | Noted: 2023-10-18

## 2023-11-16 PROBLEM — G47.33 OSA (OBSTRUCTIVE SLEEP APNEA): Status: ACTIVE | Noted: 2023-11-16

## 2023-11-16 PROBLEM — Z97.8 ORTHOPEDIC HARDWARE PRESENT: Status: ACTIVE | Noted: 2023-11-16

## 2023-11-16 LAB
ANION GAP SERPL CALCULATED.3IONS-SCNC: 10 MMOL/L (ref 9–17)
BASOPHILS # BLD: 0.06 K/UL (ref 0–0.2)
BASOPHILS NFR BLD: 1 % (ref 0–2)
BUN SERPL-MCNC: 32 MG/DL (ref 8–23)
BUN/CREAT SERPL: 25 (ref 9–20)
CALCIUM SERPL-MCNC: 9 MG/DL (ref 8.6–10.4)
CHLORIDE SERPL-SCNC: 107 MMOL/L (ref 98–107)
CO2 SERPL-SCNC: 22 MMOL/L (ref 20–31)
CREAT SERPL-MCNC: 1.3 MG/DL (ref 0.7–1.2)
CRP SERPL HS-MCNC: 3.4 MG/L (ref 0–5)
EOSINOPHIL # BLD: 0.37 K/UL (ref 0–0.44)
EOSINOPHILS RELATIVE PERCENT: 4 % (ref 1–4)
ERYTHROCYTE [DISTWIDTH] IN BLOOD BY AUTOMATED COUNT: 13.6 % (ref 11.8–14.4)
ERYTHROCYTE [SEDIMENTATION RATE] IN BLOOD BY PHOTOMETRIC METHOD: 15 MM/HR (ref 0–20)
GFR SERPL CREATININE-BSD FRML MDRD: 57 ML/MIN/1.73M2
GLUCOSE SERPL-MCNC: 112 MG/DL (ref 70–99)
HCT VFR BLD AUTO: 42.4 % (ref 40.7–50.3)
HGB BLD-MCNC: 13.6 G/DL (ref 13–17)
IMM GRANULOCYTES # BLD AUTO: 0.08 K/UL (ref 0–0.3)
IMM GRANULOCYTES NFR BLD: 1 %
LYMPHOCYTES NFR BLD: 1.28 K/UL (ref 1.1–3.7)
LYMPHOCYTES RELATIVE PERCENT: 12 % (ref 24–43)
MCH RBC QN AUTO: 30.4 PG (ref 25.2–33.5)
MCHC RBC AUTO-ENTMCNC: 32.1 G/DL (ref 28.4–34.8)
MCV RBC AUTO: 94.6 FL (ref 82.6–102.9)
MONOCYTES NFR BLD: 0.93 K/UL (ref 0.1–1.2)
MONOCYTES NFR BLD: 9 % (ref 3–12)
NEUTROPHILS NFR BLD: 73 % (ref 36–65)
NEUTS SEG NFR BLD: 7.97 K/UL (ref 1.5–8.1)
NRBC BLD-RTO: 0 PER 100 WBC
PLATELET # BLD AUTO: 209 K/UL (ref 138–453)
PMV BLD AUTO: 9.4 FL (ref 8.1–13.5)
POTASSIUM SERPL-SCNC: 4.7 MMOL/L (ref 3.7–5.3)
RBC # BLD AUTO: 4.48 M/UL (ref 4.21–5.77)
SODIUM SERPL-SCNC: 139 MMOL/L (ref 135–144)
WBC OTHER # BLD: 10.7 K/UL (ref 3.5–11.3)

## 2023-11-16 PROCEDURE — 2580000003 HC RX 258: Performed by: INTERNAL MEDICINE

## 2023-11-16 PROCEDURE — 85652 RBC SED RATE AUTOMATED: CPT

## 2023-11-16 PROCEDURE — 73562 X-RAY EXAM OF KNEE 3: CPT

## 2023-11-16 PROCEDURE — 2580000003 HC RX 258: Performed by: NURSE PRACTITIONER

## 2023-11-16 PROCEDURE — 1200000000 HC SEMI PRIVATE

## 2023-11-16 PROCEDURE — 6370000000 HC RX 637 (ALT 250 FOR IP): Performed by: NURSE PRACTITIONER

## 2023-11-16 PROCEDURE — 6360000002 HC RX W HCPCS: Performed by: INTERNAL MEDICINE

## 2023-11-16 PROCEDURE — 6360000002 HC RX W HCPCS: Performed by: NURSE PRACTITIONER

## 2023-11-16 PROCEDURE — 99285 EMERGENCY DEPT VISIT HI MDM: CPT

## 2023-11-16 PROCEDURE — 85025 COMPLETE CBC W/AUTO DIFF WBC: CPT

## 2023-11-16 PROCEDURE — 99222 1ST HOSP IP/OBS MODERATE 55: CPT | Performed by: INTERNAL MEDICINE

## 2023-11-16 PROCEDURE — 86140 C-REACTIVE PROTEIN: CPT

## 2023-11-16 PROCEDURE — 80048 BASIC METABOLIC PNL TOTAL CA: CPT

## 2023-11-16 RX ORDER — POTASSIUM CHLORIDE 20 MEQ/1
40 TABLET, EXTENDED RELEASE ORAL PRN
Status: DISCONTINUED | OUTPATIENT
Start: 2023-11-16 | End: 2023-11-18 | Stop reason: HOSPADM

## 2023-11-16 RX ORDER — SODIUM CHLORIDE 0.9 % (FLUSH) 0.9 %
5-40 SYRINGE (ML) INJECTION EVERY 12 HOURS SCHEDULED
Status: DISCONTINUED | OUTPATIENT
Start: 2023-11-16 | End: 2023-11-18 | Stop reason: HOSPADM

## 2023-11-16 RX ORDER — ACETAMINOPHEN 325 MG/1
650 TABLET ORAL EVERY 6 HOURS PRN
Status: DISCONTINUED | OUTPATIENT
Start: 2023-11-16 | End: 2023-11-18 | Stop reason: HOSPADM

## 2023-11-16 RX ORDER — SODIUM CHLORIDE 0.9 % (FLUSH) 0.9 %
10 SYRINGE (ML) INJECTION PRN
Status: DISCONTINUED | OUTPATIENT
Start: 2023-11-16 | End: 2023-11-18 | Stop reason: HOSPADM

## 2023-11-16 RX ORDER — ONDANSETRON 4 MG/1
4 TABLET, ORALLY DISINTEGRATING ORAL EVERY 8 HOURS PRN
Status: DISCONTINUED | OUTPATIENT
Start: 2023-11-16 | End: 2023-11-18 | Stop reason: HOSPADM

## 2023-11-16 RX ORDER — FENTANYL CITRATE 0.05 MG/ML
50 INJECTION, SOLUTION INTRAMUSCULAR; INTRAVENOUS ONCE
Status: DISCONTINUED | OUTPATIENT
Start: 2023-11-16 | End: 2023-11-17

## 2023-11-16 RX ORDER — ACETAMINOPHEN 650 MG/1
650 SUPPOSITORY RECTAL EVERY 6 HOURS PRN
Status: DISCONTINUED | OUTPATIENT
Start: 2023-11-16 | End: 2023-11-18 | Stop reason: HOSPADM

## 2023-11-16 RX ORDER — MAGNESIUM SULFATE 1 G/100ML
1000 INJECTION INTRAVENOUS PRN
Status: DISCONTINUED | OUTPATIENT
Start: 2023-11-16 | End: 2023-11-18 | Stop reason: HOSPADM

## 2023-11-16 RX ORDER — POLYETHYLENE GLYCOL 3350 17 G/17G
17 POWDER, FOR SOLUTION ORAL DAILY PRN
Status: DISCONTINUED | OUTPATIENT
Start: 2023-11-16 | End: 2023-11-18 | Stop reason: HOSPADM

## 2023-11-16 RX ORDER — CARVEDILOL 12.5 MG/1
12.5 TABLET ORAL 2 TIMES DAILY
Status: DISCONTINUED | OUTPATIENT
Start: 2023-11-16 | End: 2023-11-18 | Stop reason: HOSPADM

## 2023-11-16 RX ORDER — ENOXAPARIN SODIUM 100 MG/ML
40 INJECTION SUBCUTANEOUS DAILY
Status: DISCONTINUED | OUTPATIENT
Start: 2023-11-17 | End: 2023-11-16

## 2023-11-16 RX ORDER — ATORVASTATIN CALCIUM 10 MG/1
10 TABLET, FILM COATED ORAL DAILY
Status: DISCONTINUED | OUTPATIENT
Start: 2023-11-16 | End: 2023-11-18 | Stop reason: HOSPADM

## 2023-11-16 RX ORDER — POTASSIUM CHLORIDE 7.45 MG/ML
10 INJECTION INTRAVENOUS PRN
Status: DISCONTINUED | OUTPATIENT
Start: 2023-11-16 | End: 2023-11-18 | Stop reason: HOSPADM

## 2023-11-16 RX ORDER — ENOXAPARIN SODIUM 100 MG/ML
30 INJECTION SUBCUTANEOUS 2 TIMES DAILY
Status: DISCONTINUED | OUTPATIENT
Start: 2023-11-17 | End: 2023-11-18 | Stop reason: HOSPADM

## 2023-11-16 RX ORDER — ONDANSETRON 2 MG/ML
4 INJECTION INTRAMUSCULAR; INTRAVENOUS EVERY 6 HOURS PRN
Status: DISCONTINUED | OUTPATIENT
Start: 2023-11-16 | End: 2023-11-18 | Stop reason: HOSPADM

## 2023-11-16 RX ORDER — SODIUM CHLORIDE 9 MG/ML
INJECTION, SOLUTION INTRAVENOUS PRN
Status: DISCONTINUED | OUTPATIENT
Start: 2023-11-16 | End: 2023-11-18 | Stop reason: HOSPADM

## 2023-11-16 RX ORDER — LISINOPRIL 10 MG/1
10 TABLET ORAL DAILY
Status: DISCONTINUED | OUTPATIENT
Start: 2023-11-17 | End: 2023-11-18 | Stop reason: HOSPADM

## 2023-11-16 RX ORDER — AMLODIPINE BESYLATE 10 MG/1
10 TABLET ORAL DAILY
Status: DISCONTINUED | OUTPATIENT
Start: 2023-11-17 | End: 2023-11-18 | Stop reason: HOSPADM

## 2023-11-16 RX ADMIN — CEFTRIAXONE SODIUM 2000 MG: 2 INJECTION, POWDER, FOR SOLUTION INTRAMUSCULAR; INTRAVENOUS at 17:50

## 2023-11-16 RX ADMIN — VANCOMYCIN HYDROCHLORIDE 2500 MG: 5 INJECTION, POWDER, LYOPHILIZED, FOR SOLUTION INTRAVENOUS at 21:48

## 2023-11-16 RX ADMIN — CARVEDILOL 12.5 MG: 12.5 TABLET, FILM COATED ORAL at 21:48

## 2023-11-16 RX ADMIN — SODIUM CHLORIDE: 9 INJECTION, SOLUTION INTRAVENOUS at 21:45

## 2023-11-16 ASSESSMENT — ENCOUNTER SYMPTOMS
SORE THROAT: 0
COLOR CHANGE: 0
SHORTNESS OF BREATH: 0
EYE DISCHARGE: 0
COUGH: 0
VOMITING: 0
RHINORRHEA: 0
DIARRHEA: 0
EYE REDNESS: 0
NAUSEA: 0

## 2023-11-16 ASSESSMENT — PAIN - FUNCTIONAL ASSESSMENT: PAIN_FUNCTIONAL_ASSESSMENT: 0-10

## 2023-11-16 ASSESSMENT — LIFESTYLE VARIABLES: HOW OFTEN DO YOU HAVE A DRINK CONTAINING ALCOHOL: NEVER

## 2023-11-16 ASSESSMENT — PAIN SCALES - GENERAL
PAINLEVEL_OUTOF10: 0

## 2023-11-16 NOTE — ED NOTES
6 attempts to contact Dr. Dario Erazo , pt's ortho surgeon from [facility] as of [phone]. No answer at this time. Dr. David Llanos contacted. Attempts still being made for Dr. Dario Erazo.       Jose Mnadel, RN  11/16/23 6133

## 2023-11-16 NOTE — ED NOTES
Picture of pt's knee wound taken and placed in medical chart.       Jazmin Swenson RN  11/16/23 1217

## 2023-11-16 NOTE — ED NOTES
Pt presenting to the ED with complaints of left knee dehiscence after pt sat down. Pt reports that he recently had a knee replacement 4 months ago, and this happened suddenly. Pt reports that he has been going to PT as instructed, and has been walking with a cane with no post op complications. Upon arrival to ED, pt's left knee is wrapped. Pt's knee had a 3 inch open wound. Pt's dressing cleaned and new one applied.       Debbi Montes De Oca RN  11/16/23 9354

## 2023-11-16 NOTE — ED NOTES
Pt's dressing changed, and reapplied. Knee immobilizer applied to pt for comfort. Pt denies any needs.       Prosper Rene, RN  11/16/23 1193

## 2023-11-17 ENCOUNTER — ANESTHESIA (OUTPATIENT)
Dept: OPERATING ROOM | Age: 78
End: 2023-11-17
Payer: MEDICARE

## 2023-11-17 ENCOUNTER — ANESTHESIA EVENT (OUTPATIENT)
Dept: OPERATING ROOM | Age: 78
End: 2023-11-17
Payer: MEDICARE

## 2023-11-17 PROBLEM — T81.31XA RUPTURE OF OPERATION WOUND: Status: ACTIVE | Noted: 2023-11-17

## 2023-11-17 LAB
ANION GAP SERPL CALCULATED.3IONS-SCNC: 12 MMOL/L (ref 9–17)
BASOPHILS # BLD: 0.07 K/UL (ref 0–0.2)
BASOPHILS NFR BLD: 1 % (ref 0–2)
BUN SERPL-MCNC: 24 MG/DL (ref 8–23)
BUN/CREAT SERPL: 24 (ref 9–20)
CALCIUM SERPL-MCNC: 8.6 MG/DL (ref 8.6–10.4)
CHLORIDE SERPL-SCNC: 107 MMOL/L (ref 98–107)
CO2 SERPL-SCNC: 20 MMOL/L (ref 20–31)
CREAT SERPL-MCNC: 1 MG/DL (ref 0.7–1.2)
EOSINOPHIL # BLD: 0.4 K/UL (ref 0–0.44)
EOSINOPHILS RELATIVE PERCENT: 4 % (ref 1–4)
ERYTHROCYTE [DISTWIDTH] IN BLOOD BY AUTOMATED COUNT: 13.4 % (ref 11.8–14.4)
GFR SERPL CREATININE-BSD FRML MDRD: >60 ML/MIN/1.73M2
GLUCOSE SERPL-MCNC: 101 MG/DL (ref 70–99)
HCT VFR BLD AUTO: 40.2 % (ref 40.7–50.3)
HGB BLD-MCNC: 12.7 G/DL (ref 13–17)
IMM GRANULOCYTES # BLD AUTO: 0.04 K/UL (ref 0–0.3)
IMM GRANULOCYTES NFR BLD: 0 %
INR PPP: 1.2
LYMPHOCYTES NFR BLD: 1.42 K/UL (ref 1.1–3.7)
LYMPHOCYTES RELATIVE PERCENT: 16 % (ref 24–43)
MCH RBC QN AUTO: 30.5 PG (ref 25.2–33.5)
MCHC RBC AUTO-ENTMCNC: 31.6 G/DL (ref 28.4–34.8)
MCV RBC AUTO: 96.6 FL (ref 82.6–102.9)
MONOCYTES NFR BLD: 0.74 K/UL (ref 0.1–1.2)
MONOCYTES NFR BLD: 8 % (ref 3–12)
NEUTROPHILS NFR BLD: 71 % (ref 36–65)
NEUTS SEG NFR BLD: 6.34 K/UL (ref 1.5–8.1)
NRBC BLD-RTO: 0 PER 100 WBC
PLATELET # BLD AUTO: 186 K/UL (ref 138–453)
PMV BLD AUTO: 9.6 FL (ref 8.1–13.5)
POTASSIUM SERPL-SCNC: 4.3 MMOL/L (ref 3.7–5.3)
PROTHROMBIN TIME: 14.6 SEC (ref 11.5–14.2)
RBC # BLD AUTO: 4.16 M/UL (ref 4.21–5.77)
SODIUM SERPL-SCNC: 139 MMOL/L (ref 135–144)
WBC OTHER # BLD: 9 K/UL (ref 3.5–11.3)

## 2023-11-17 PROCEDURE — 2580000003 HC RX 258: Performed by: NURSE PRACTITIONER

## 2023-11-17 PROCEDURE — 6360000002 HC RX W HCPCS: Performed by: ANESTHESIOLOGY

## 2023-11-17 PROCEDURE — 85025 COMPLETE CBC W/AUTO DIFF WBC: CPT

## 2023-11-17 PROCEDURE — 97530 THERAPEUTIC ACTIVITIES: CPT

## 2023-11-17 PROCEDURE — 97162 PT EVAL MOD COMPLEX 30 MIN: CPT

## 2023-11-17 PROCEDURE — 2780000010 HC IMPLANT OTHER: Performed by: ORTHOPAEDIC SURGERY

## 2023-11-17 PROCEDURE — 2580000003 HC RX 258: Performed by: NURSE ANESTHETIST, CERTIFIED REGISTERED

## 2023-11-17 PROCEDURE — 80048 BASIC METABOLIC PNL TOTAL CA: CPT

## 2023-11-17 PROCEDURE — 2500000003 HC RX 250 WO HCPCS: Performed by: NURSE ANESTHETIST, CERTIFIED REGISTERED

## 2023-11-17 PROCEDURE — C1776 JOINT DEVICE (IMPLANTABLE): HCPCS | Performed by: ORTHOPAEDIC SURGERY

## 2023-11-17 PROCEDURE — 6370000000 HC RX 637 (ALT 250 FOR IP): Performed by: NURSE PRACTITIONER

## 2023-11-17 PROCEDURE — 99232 SBSQ HOSP IP/OBS MODERATE 35: CPT | Performed by: INTERNAL MEDICINE

## 2023-11-17 PROCEDURE — 3700000001 HC ADD 15 MINUTES (ANESTHESIA): Performed by: ORTHOPAEDIC SURGERY

## 2023-11-17 PROCEDURE — 1200000000 HC SEMI PRIVATE

## 2023-11-17 PROCEDURE — 3600000005 HC SURGERY LEVEL 5 BASE: Performed by: ORTHOPAEDIC SURGERY

## 2023-11-17 PROCEDURE — 2709999900 HC NON-CHARGEABLE SUPPLY: Performed by: ORTHOPAEDIC SURGERY

## 2023-11-17 PROCEDURE — 85610 PROTHROMBIN TIME: CPT

## 2023-11-17 PROCEDURE — 6360000002 HC RX W HCPCS: Performed by: NURSE PRACTITIONER

## 2023-11-17 PROCEDURE — 0SRW0JZ REPLACEMENT OF LEFT KNEE JOINT, TIBIAL SURFACE WITH SYNTHETIC SUBSTITUTE, OPEN APPROACH: ICD-10-PCS | Performed by: ORTHOPAEDIC SURGERY

## 2023-11-17 PROCEDURE — 7100000000 HC PACU RECOVERY - FIRST 15 MIN: Performed by: ORTHOPAEDIC SURGERY

## 2023-11-17 PROCEDURE — 0SPW0JZ REMOVAL OF SYNTHETIC SUBSTITUTE FROM LEFT KNEE JOINT, TIBIAL SURFACE, OPEN APPROACH: ICD-10-PCS | Performed by: ORTHOPAEDIC SURGERY

## 2023-11-17 PROCEDURE — 3600000015 HC SURGERY LEVEL 5 ADDTL 15MIN: Performed by: ORTHOPAEDIC SURGERY

## 2023-11-17 PROCEDURE — 97116 GAIT TRAINING THERAPY: CPT

## 2023-11-17 PROCEDURE — 7100000001 HC PACU RECOVERY - ADDTL 15 MIN: Performed by: ORTHOPAEDIC SURGERY

## 2023-11-17 PROCEDURE — 2720000010 HC SURG SUPPLY STERILE: Performed by: ORTHOPAEDIC SURGERY

## 2023-11-17 PROCEDURE — 3700000000 HC ANESTHESIA ATTENDED CARE: Performed by: ORTHOPAEDIC SURGERY

## 2023-11-17 PROCEDURE — 0S9D0ZZ DRAINAGE OF LEFT KNEE JOINT, OPEN APPROACH: ICD-10-PCS | Performed by: ORTHOPAEDIC SURGERY

## 2023-11-17 PROCEDURE — 36415 COLL VENOUS BLD VENIPUNCTURE: CPT

## 2023-11-17 PROCEDURE — 6360000002 HC RX W HCPCS: Performed by: NURSE ANESTHETIST, CERTIFIED REGISTERED

## 2023-11-17 DEVICE — IMPLANTABLE DEVICE: Type: IMPLANTABLE DEVICE | Site: KNEE | Status: FUNCTIONAL

## 2023-11-17 RX ORDER — PHENYLEPHRINE HCL IN 0.9% NACL 1 MG/10 ML
SYRINGE (ML) INTRAVENOUS PRN
Status: DISCONTINUED | OUTPATIENT
Start: 2023-11-17 | End: 2023-11-17 | Stop reason: SDUPTHER

## 2023-11-17 RX ORDER — SODIUM CHLORIDE 0.9 % (FLUSH) 0.9 %
5-40 SYRINGE (ML) INJECTION EVERY 12 HOURS SCHEDULED
Status: DISCONTINUED | OUTPATIENT
Start: 2023-11-17 | End: 2023-11-17 | Stop reason: HOSPADM

## 2023-11-17 RX ORDER — ASPIRIN 325 MG
325 TABLET ORAL 2 TIMES DAILY
Qty: 60 TABLET | Refills: 0 | Status: SHIPPED | OUTPATIENT
Start: 2023-11-18

## 2023-11-17 RX ORDER — ROCURONIUM BROMIDE 50 MG/5 ML
SYRINGE (ML) INTRAVENOUS PRN
Status: DISCONTINUED | OUTPATIENT
Start: 2023-11-17 | End: 2023-11-17 | Stop reason: SDUPTHER

## 2023-11-17 RX ORDER — ASCORBIC ACID 500 MG
1000 TABLET ORAL DAILY
COMMUNITY

## 2023-11-17 RX ORDER — SODIUM CHLORIDE 9 MG/ML
INJECTION, SOLUTION INTRAVENOUS PRN
Status: DISCONTINUED | OUTPATIENT
Start: 2023-11-17 | End: 2023-11-17 | Stop reason: HOSPADM

## 2023-11-17 RX ORDER — DEXAMETHASONE SODIUM PHOSPHATE 10 MG/ML
INJECTION, SOLUTION INTRAMUSCULAR; INTRAVENOUS PRN
Status: DISCONTINUED | OUTPATIENT
Start: 2023-11-17 | End: 2023-11-17 | Stop reason: SDUPTHER

## 2023-11-17 RX ORDER — TRANEXAMIC ACID 100 MG/ML
INJECTION, SOLUTION INTRAVENOUS PRN
Status: DISCONTINUED | OUTPATIENT
Start: 2023-11-17 | End: 2023-11-17 | Stop reason: SDUPTHER

## 2023-11-17 RX ORDER — ACETAMINOPHEN 500 MG
1000 TABLET ORAL DAILY
Status: ON HOLD | COMMUNITY
End: 2023-11-17 | Stop reason: HOSPADM

## 2023-11-17 RX ORDER — LIDOCAINE HYDROCHLORIDE 20 MG/ML
INJECTION, SOLUTION EPIDURAL; INFILTRATION; INTRACAUDAL; PERINEURAL PRN
Status: DISCONTINUED | OUTPATIENT
Start: 2023-11-17 | End: 2023-11-17 | Stop reason: SDUPTHER

## 2023-11-17 RX ORDER — PROPOFOL 10 MG/ML
INJECTION, EMULSION INTRAVENOUS PRN
Status: DISCONTINUED | OUTPATIENT
Start: 2023-11-17 | End: 2023-11-17 | Stop reason: SDUPTHER

## 2023-11-17 RX ORDER — HYDROMORPHONE HYDROCHLORIDE 1 MG/ML
0.5 INJECTION, SOLUTION INTRAMUSCULAR; INTRAVENOUS; SUBCUTANEOUS EVERY 5 MIN PRN
Status: DISCONTINUED | OUTPATIENT
Start: 2023-11-17 | End: 2023-11-17 | Stop reason: HOSPADM

## 2023-11-17 RX ORDER — FENTANYL CITRATE 50 UG/ML
25 INJECTION, SOLUTION INTRAMUSCULAR; INTRAVENOUS EVERY 5 MIN PRN
Status: DISCONTINUED | OUTPATIENT
Start: 2023-11-17 | End: 2023-11-17 | Stop reason: HOSPADM

## 2023-11-17 RX ORDER — AMLODIPINE BESYLATE 10 MG/1
10 TABLET ORAL DAILY
COMMUNITY

## 2023-11-17 RX ORDER — FENTANYL CITRATE 50 UG/ML
INJECTION, SOLUTION INTRAMUSCULAR; INTRAVENOUS PRN
Status: DISCONTINUED | OUTPATIENT
Start: 2023-11-17 | End: 2023-11-17 | Stop reason: SDUPTHER

## 2023-11-17 RX ORDER — ONDANSETRON 2 MG/ML
4 INJECTION INTRAMUSCULAR; INTRAVENOUS
Status: DISCONTINUED | OUTPATIENT
Start: 2023-11-17 | End: 2023-11-17 | Stop reason: HOSPADM

## 2023-11-17 RX ORDER — LANOLIN ALCOHOL/MO/W.PET/CERES
3 CREAM (GRAM) TOPICAL NIGHTLY PRN
Status: DISCONTINUED | OUTPATIENT
Start: 2023-11-17 | End: 2023-11-18 | Stop reason: HOSPADM

## 2023-11-17 RX ORDER — HYDROCODONE BITARTRATE AND ACETAMINOPHEN 5; 325 MG/1; MG/1
2 TABLET ORAL EVERY 4 HOURS PRN
Status: DISCONTINUED | OUTPATIENT
Start: 2023-11-17 | End: 2023-11-18 | Stop reason: HOSPADM

## 2023-11-17 RX ORDER — HYDROCODONE BITARTRATE AND ACETAMINOPHEN 5; 325 MG/1; MG/1
1 TABLET ORAL EVERY 4 HOURS PRN
Status: DISCONTINUED | OUTPATIENT
Start: 2023-11-17 | End: 2023-11-18 | Stop reason: HOSPADM

## 2023-11-17 RX ORDER — OXYCODONE HYDROCHLORIDE AND ACETAMINOPHEN 5; 325 MG/1; MG/1
1 TABLET ORAL EVERY 4 HOURS PRN
Qty: 42 TABLET | Refills: 0 | Status: SHIPPED | OUTPATIENT
Start: 2023-11-17 | End: 2023-11-24

## 2023-11-17 RX ORDER — SODIUM CHLORIDE, SODIUM LACTATE, POTASSIUM CHLORIDE, CALCIUM CHLORIDE 600; 310; 30; 20 MG/100ML; MG/100ML; MG/100ML; MG/100ML
INJECTION, SOLUTION INTRAVENOUS CONTINUOUS PRN
Status: DISCONTINUED | OUTPATIENT
Start: 2023-11-17 | End: 2023-11-17 | Stop reason: SDUPTHER

## 2023-11-17 RX ORDER — ONDANSETRON 2 MG/ML
INJECTION INTRAMUSCULAR; INTRAVENOUS PRN
Status: DISCONTINUED | OUTPATIENT
Start: 2023-11-17 | End: 2023-11-17 | Stop reason: SDUPTHER

## 2023-11-17 RX ORDER — SODIUM CHLORIDE 0.9 % (FLUSH) 0.9 %
5-40 SYRINGE (ML) INJECTION PRN
Status: DISCONTINUED | OUTPATIENT
Start: 2023-11-17 | End: 2023-11-17 | Stop reason: HOSPADM

## 2023-11-17 RX ADMIN — PROPOFOL 150 MG: 10 INJECTION, EMULSION INTRAVENOUS at 15:39

## 2023-11-17 RX ADMIN — Medication 100 MCG: at 16:35

## 2023-11-17 RX ADMIN — FENTANYL CITRATE 100 MCG: 50 INJECTION INTRAMUSCULAR; INTRAVENOUS at 15:39

## 2023-11-17 RX ADMIN — SUGAMMADEX 200 MG: 100 INJECTION, SOLUTION INTRAVENOUS at 17:17

## 2023-11-17 RX ADMIN — FENTANYL CITRATE 50 MCG: 50 INJECTION INTRAMUSCULAR; INTRAVENOUS at 16:44

## 2023-11-17 RX ADMIN — ONDANSETRON 4 MG: 2 INJECTION INTRAMUSCULAR; INTRAVENOUS at 16:37

## 2023-11-17 RX ADMIN — FENTANYL CITRATE 50 MCG: 50 INJECTION INTRAMUSCULAR; INTRAVENOUS at 17:23

## 2023-11-17 RX ADMIN — AMLODIPINE BESYLATE 10 MG: 10 TABLET ORAL at 09:47

## 2023-11-17 RX ADMIN — Medication 100 MCG: at 15:51

## 2023-11-17 RX ADMIN — TRANEXAMIC ACID 1000 MG: 100 INJECTION, SOLUTION INTRAVENOUS at 16:09

## 2023-11-17 RX ADMIN — Medication 3 MG: at 00:31

## 2023-11-17 RX ADMIN — Medication 50 MG: at 15:39

## 2023-11-17 RX ADMIN — FENTANYL CITRATE 50 MCG: 50 INJECTION INTRAMUSCULAR; INTRAVENOUS at 16:20

## 2023-11-17 RX ADMIN — LISINOPRIL 10 MG: 10 TABLET ORAL at 09:47

## 2023-11-17 RX ADMIN — SODIUM CHLORIDE, POTASSIUM CHLORIDE, SODIUM LACTATE AND CALCIUM CHLORIDE: 600; 310; 30; 20 INJECTION, SOLUTION INTRAVENOUS at 17:22

## 2023-11-17 RX ADMIN — FENTANYL CITRATE 50 MCG: 50 INJECTION INTRAMUSCULAR; INTRAVENOUS at 17:29

## 2023-11-17 RX ADMIN — VANCOMYCIN HYDROCHLORIDE 1250 MG: 5 INJECTION, POWDER, LYOPHILIZED, FOR SOLUTION INTRAVENOUS at 22:02

## 2023-11-17 RX ADMIN — Medication 100 MCG: at 16:50

## 2023-11-17 RX ADMIN — FENTANYL CITRATE 25 MCG: 50 INJECTION INTRAMUSCULAR; INTRAVENOUS at 18:01

## 2023-11-17 RX ADMIN — PROPOFOL 50 MG: 10 INJECTION, EMULSION INTRAVENOUS at 16:20

## 2023-11-17 RX ADMIN — Medication 3 MG: at 21:54

## 2023-11-17 RX ADMIN — FENTANYL CITRATE 25 MCG: 50 INJECTION INTRAMUSCULAR; INTRAVENOUS at 18:11

## 2023-11-17 RX ADMIN — CARVEDILOL 12.5 MG: 12.5 TABLET, FILM COATED ORAL at 09:47

## 2023-11-17 RX ADMIN — DEXAMETHASONE SODIUM PHOSPHATE 10 MG: 10 INJECTION, SOLUTION INTRAMUSCULAR; INTRAVENOUS at 15:49

## 2023-11-17 RX ADMIN — LIDOCAINE HYDROCHLORIDE 80 MG: 20 INJECTION, SOLUTION EPIDURAL; INFILTRATION; INTRACAUDAL; PERINEURAL at 15:39

## 2023-11-17 ASSESSMENT — PAIN SCALES - GENERAL
PAINLEVEL_OUTOF10: 5
PAINLEVEL_OUTOF10: 0
PAINLEVEL_OUTOF10: 5
PAINLEVEL_OUTOF10: 4

## 2023-11-17 ASSESSMENT — PAIN DESCRIPTION - DESCRIPTORS: DESCRIPTORS: BURNING

## 2023-11-17 ASSESSMENT — PAIN DESCRIPTION - LOCATION: LOCATION: KNEE

## 2023-11-17 ASSESSMENT — PAIN DESCRIPTION - PAIN TYPE: TYPE: SURGICAL PAIN

## 2023-11-17 ASSESSMENT — PAIN DESCRIPTION - ORIENTATION: ORIENTATION: LEFT

## 2023-11-17 ASSESSMENT — PAIN DESCRIPTION - FREQUENCY: FREQUENCY: CONTINUOUS

## 2023-11-17 ASSESSMENT — PAIN DESCRIPTION - ONSET: ONSET: ON-GOING

## 2023-11-17 NOTE — PROGRESS NOTES
Occupational Therapy    DATE: 2023    NAME: Lady Laws  MRN: [id number]   : 1945    Patient not seen this date for Occupational Therapy due to:      [] Cancel by RN or physician due to:    [] Hemodialysis    [] Critical Lab Value Level     [] Blood transfusion in progress    [] Acute or unstable cardiovascular status   _MAP < 55 or more than >115  _HR < 40 or > 130    [] Acute or unstable pulmonary status   -FiO2 > 60%   _RR < 5 or >40    _O2 sats < 85%    [] Strict Bedrest    [] Off Unit for surgery or procedure    [] Off Unit for testing       [] Pending imaging to R/O fracture    [] Refusal by Patient      [] Intubated    [x] Other: Pt's L knee dislocated, pt to have surgery this AM. Will check back as able.       [] OT being discontinued at this time. Patient independent. No further needs. [] OT being discontinued at this time as the patient has been transferred to hospice care. No further needs.     Qi Zayas OTR/L

## 2023-11-17 NOTE — PROGRESS NOTES
Pt admitted to room 2021 from ER  Oriented to room and call light/tv controls. Bed in lowest position, wheels locked, 2/4 side rails up  Call light in reach, room free of clutter, adequate lighting provided.

## 2023-11-17 NOTE — ANESTHESIA PRE PROCEDURE
Component Value Date/Time    PROTIME 14.6 11/17/2023 06:24 AM    INR 1.2 11/17/2023 06:24 AM    APTT 32.3 09/21/2023 08:38 AM       HCG (If Applicable): No results found for: \"PREGTESTUR\", \"PREGSERUM\", \"HCG\", \"HCGQUANT\"     ABGs: No results found for: \"PHART\", \"PO2ART\", \"TOV8QVS\", \"JDF8LGW\", \"BEART\", \"Y8OVRHPO\"     Type & Screen (If Applicable):  No results found for: \"LABABO\", \"LABRH\"    Drug/Infectious Status (If Applicable):  No results found for: \"HIV\", \"HEPCAB\"    COVID-19 Screening (If Applicable):   Lab Results   Component Value Date/Time    COVID19 Not Detected 05/12/2020 10:00 AM           Anesthesia Evaluation  Patient summary reviewed and Nursing notes reviewed no history of anesthetic complications:   Airway: Mallampati: II  TM distance: >3 FB   Neck ROM: full  Mouth opening: > = 3 FB   Dental: normal exam         Pulmonary:normal exam    (+) sleep apnea:                             Cardiovascular:  Exercise tolerance: no interval change,   (+) hypertension:, hyperlipidemia    (-) past MI, CAD, CABG/stent and  angina                Neuro/Psych:               GI/Hepatic/Renal:        (-) GERD       Endo/Other:    (+) : arthritis:., malignancy/cancer. Abdominal:             Vascular: Other Findings:             Anesthesia Plan      general     ASA 3       Induction: intravenous. MIPS: Postoperative opioids intended and Prophylactic antiemetics administered. Anesthetic plan and risks discussed with patient. Plan discussed with CRNA.     Attending anesthesiologist reviewed and agrees with Preprocedure content                [facility]   11/17/2023 0 = understands/communicates without difficulty

## 2023-11-17 NOTE — PROGRESS NOTES
Ortho Consult    Patient well known to me, had Left TKR one month ago, sat down in optometrist's office yesterday, knee incision completely dehisced without trauma. Seen in ED, admitted for surgery, cleared by medical service. Having no pain, alert and conversant. A small knee brace is applied over an elastic bandage on the left. No calf tenderness, N-V nl    Discussed I & D, secondary wound closure, left TKR later today.     NPO

## 2023-11-17 NOTE — PROGRESS NOTES
Transitions of Care Pharmacy Service   Medication Review    The patient's list of current home medications has been reviewed. Source(s) of information: Licha BLANC/Patient interview    Based on information provided by the above source(s), I have updated the patient's home med list as described below. Please review the ACTION REQUESTED section of this note for any discrepancies on current hospital orders. I changed or updated the following medications on the patient's home medication list:  Discontinued CBD softgels x 2   Added Vitamin C  Tylenol  Vicks Vapoinhaler   Adjusted   Amlodipine     Other Notes Patient was on a 21 day course of Xarelto from his previous TKA in October. Patient stated his last dose of Xarelto was on Monday, 11/13/23. PROVIDER ACTION REQUESTED  Medications that need to be addressed by a physician/nurse practitioner:    Medication Action Requested   None              Please feel free to call me with any questions about this encounter. Thank you. This note will be reviewed and co-signed by the [facility] Pharmacist.    Neelam Clarke PharmD student  [facility]  Phone:  [phone]  Fax: [phone]      Electronically signed by Neelam Clarke on 11/17/2023 at 12:56 PM     Prior to Admission medications    Medication Sig Start Date End Date Taking?  Authorizing Provider   amLODIPine (NORVASC) 10 MG tablet Take 1 tablet by mouth daily   Yes Nannette Gunderson MD   vitamin C (ASCORBIC ACID) 500 MG tablet Take 2 tablets by mouth daily   Yes Nannette Gunderson MD   acetaminophen (TYLENOL) 500 MG tablet Take 2 tablets by mouth daily   Yes Nannette Gunderson MD   Aromatic Inhalants (VICKS VAPOINHALER IN) Inhale into the lungs Takes every evening   Yes Nannette Gunderson MD   lisinopril (PRINIVIL) 10 MG tablet Take 1 tablet by mouth daily    Nannette Gunderson MD   atorvastatin (LIPITOR) 10 MG tablet Take 1 tablet by mouth daily

## 2023-11-17 NOTE — PROGRESS NOTES
[address] NOTE    Room # 2021/2021-01   Name: Nitza Doan              Reason for visit: Routine    I visited the patient. Admit Date & Time: 11/16/2023 12:47 PM    Assessment:  Nitza Doan is a 70 y.o. male. Upon entering the room patient states well, states no major needs or prayers. Patient kindly declines Spiritual Care visit.  leaves prayer card for patient for possible follow up as needed. Intervention:   provided a ministry presence. Outcome:  Patient grateful for the visit. Plan:  Chaplains will remain available to offer spiritual and emotional support as needed. Electronically signed by Alba Carver. Chaplain Henrik, on 11/17/2023 at 10:30 AM.  [address]      11/17/23 1029   Encounter Summary   Encounter Overview/Reason  Initial Encounter   Service Provided For: Patient and family together   Referral/Consult From:  [address] Family members   Last Encounter  11/17/23   Complexity of Encounter Low   Begin Time 0913   End Time  0914   Total Time Calculated 1 min   Assessment/Intervention/Outcome   Assessment Calm   Intervention Sustaining Presence/Ministry of presence   Outcome Engaged in conversation; Refused/Declined

## 2023-11-17 NOTE — ED NOTES
Dr. Noel Josue called back. RN spoke with doctor. Per Dr Noel Josue, pt's wound needs to be kept wet with normal saline dressings snugly but not fully packed., Kept covered with an ABD, gauze and ace bandage and knee immobilizer for pain and comfort.       Egdar Levin RN  11/16/23 1954

## 2023-11-17 NOTE — BRIEF OP NOTE
Brief Postoperative Note      Patient: Tiffany Abraham  YOB: 1945  MRN: [id number]    Date of Procedure: 11/17/2023    Pre-Op Diagnosis Codes:     * Deep disruption or dehiscence of operation wound, initial encounter [T81.32XA]    Post-Op Diagnosis:  Same, with failure of tibial knee replacement component, left knee       Procedure(s):  INCISION AND DRAINAGE, SECONDARY CLOSURE LEFT TOTAL KNEE WOUND DEHISCENCE  ONE-PART REVISION , LEFT TOTAL KNEE REPLACEMENT    Surgeon(s): Vinita Mantilla MD    Assistant:  Surgical Assistant: Renetta Driver  First Assistant: Escobar Martin RN    Anesthesia: General    Estimated Blood Loss (mL): less than 50     Complications: None    Specimens:   * No specimens in log *    Implants:  Implant Name Type Inv.  Item Serial No.  Lot No. LRB No. Used Action   INSERT TIB FIX BEAR 7 8 MM LT MEDL KNEE STBL AOX ATTUNE - [id number]  INSERT TIB FIX BEAR 7 8 MM LT MEDL KNEE STBL AOX ATTUNE  [employer]-WD [id number] Left 1 Implanted   kerecis omega 3 surgiclose meshed 2:1     [id number] Left 1 Implanted         Drains: * No LDAs found *    Findings: 30 mm dehiscence, left knee incision; complete dehiscence of deep fascial layer closure, left total knee replacement      Electronically signed by Fran Ly MD on 11/17/2023 at 5:12 PM

## 2023-11-17 NOTE — PROGRESS NOTES
[facility]  Office: [id number], DO, Marcelina Fenton, DO, James Ayon, DO, Harvinder Castellanos Blood, DO, Gio Coleman MD, Laly Goldberg MD, Sung Mcelroy MD, Neville Horowitz MD,  Diogenes Finch MD, Logan Lancaster MD, Arely Muhammad MD,  Coco Diego MD, Daisy Rothman MD, Jessica John DO, Tabby Plaza MD,  Nena Reid DO, Inocencia Thomas MD, Rebecca Sanders MD, Atilio Crane MD, Linda Gan MD,  Eve Pulliam MD, Beto Lujan MD, Ira Kelly MD, Markel Murray MD, Kori Gipson MD, Theresa Baker DO, Mateo Sandoval, DO, Riley Ochoa MD,  Laura Mccarthy MD, Jayme Escobar, CNP,  Steve Shea, CNP, Karissa Turner, CNP,  Joby Berg, DNP, Loki Jaime, CNP, Chandan Hora, CNP, Hank Lomeli, CNP, Bibi Dunham, CNP, Bertrand Ball, CNP, Neftaly Vasquezob, [address] , Norman Anne, CNP, Miranda Mahmood, CNS, Mercer Fabry, CNP, Destinee Chowdhury, [address] [facility]    Progress Note    11/17/2023    10:50 AM    Name:   Nitza Doan  MRN:     [id number]     [address]:      [de-identified]   Room:   2021/2021-01  IP Day:  1  Admit Date:  11/16/2023 12:47 PM    PCP:   Edu Staton DO  Code Status:  Full Code    Subjective:     C/C:   Chief Complaint   Patient presents with    Wound Dehiscence     Knee cap replacement      Interval History Status: not changed. Patient is resting in bed awaiting surgical intervention later today. Denies any complaints of chest pain, shortness of breath, nausea vomiting, fevers chills or acute complaints. Brief History: This is a 59-year-old male that underwent total knee arthroplasty approximately 1 month ago and now presents with dehiscence of the wound and x-ray findings concerning for lateral displacement of the patella. He is admitted for repeat orthopedic evaluation. He still is scheduled for revision of his knee surgery later today.     Review murmur  Abdomen:  soft, nontender, nondistended, normal bowel sounds, no masses, hepatomegaly, splenomegaly  Extremities:  no edema, redness, tenderness in the calves  Skin:  no gross lesions, rashes, induration    Assessment:        Hospital Problems             Last Modified POA    * (Principal) Knee cap dislocation, left, initial encounter 11/16/2023 Yes    Primary hypertension 11/16/2023 Yes    Class 2 severe obesity due to excess calories with serious comorbidity and body mass index (BMI) of 37.0 to 37.9 in adult [facility]) 11/16/2023 Yes    Orthopedic hardware present 11/16/2023 Yes    Wound dehiscence, surgical, initial encounter 11/16/2023 Yes    PHILIPPE (obstructive sleep apnea) 11/16/2023 Yes       Plan:        Continue home CPAP  Orthopedic evaluation, surgical exploration and repair of lateral displacement patella today.   Monitor and control blood pressure  PT and OT postop  Continue efforts for pain control  Trend labs as needed    Ashley Shaikh DO  11/17/2023  10:50 AM

## 2023-11-17 NOTE — OP NOTE
Operative Note      Patient: Lady Laws  YOB: 1945  MRN: [id number]    Date of Procedure: 11/17/2023    Pre-Op Diagnosis Codes:     * Deep disruption or dehiscence of operation wound, initial encounter [T81.32XA]    Post-Op Diagnosis: Same, with failure of tibial knee replacement component, right knee       Procedure(s):  1) INCISION AND DRAINAGE, SECONDARY CLOSURE, LEFT TOTAL KNEE  2) ONE-PART TIBIAL REVISION, LEFT TOTAL KNEE REPLACEMENT    Surgeon(s): Jennifer Mantilla MD    Assistant:   Surgical Assistant: Thiago Almonte; Macrina Malhotra  First Assistant: Lloyd Sanders RN    Anesthesia: General    Estimated Blood Loss (mL): less than 50     Complications: None    Specimens:   * No specimens in log *    Implants:  Implant Name Type Inv. Item Serial No.  Lot No. LRB No. Used Action   INSERT TIB FIX BEAR 7 8 MM LT MEDL KNEE STBL AOX ATTUNE - [id number]  INSERT TIB FIX BEAR 7 8 MM LT MEDL KNEE STBL AOX ATTUNE  [employer] [id number] Left 1 Implanted   kerecis omega 3 surgiclose meshed 2:1     [id number] Left 1 Implanted         Drains: * No LDAs found *    Findings: 30 mm dehiscence of skin incision; complete dehiscence of deep fascial layer, left total knee replacement      Detailed Description of Procedure: Indications  This patient experienced a sudden dehiscence of his four-week-old left knee replacement incision, upon sitting down into an office chair, having had the normal progressive advancement of mobility in his physical therapy sessions until now. He insists he had no trauma or undue stress applied externally to the knee. He was placed on IV antibiotics and admitted through the ED for orthopaedic evaluation and preparation for surgery. It is recommended that he have arthrotomy and exploration of his total knee replacement, with irrigation and debridement and secondary closure of the wound dehiscence.  After discussion of the particulars of surgical procedure, the risks and benefits, as well as alternative treatments, I believe that all of their questions were answered to their satisfaction. Informed consent is now given to proceed with surgery as discussed. Procedure  After proper patient identification and marking of the surgical site in the preoperative area, and following update of the history and physical examination, the patient was brought to the operative suite and placed on the table in supine position. The patient was identified as Barb Bhagat. An adequate general anesthetic was induced. Next the lower extremity was prepped and draped free in the usual sterile fashion. The knee was inspected, and an elliptical area of dehiscence over the immediate area of the inferior patellar pole was documented of 30 mm x 15 mm in size. The remainder of the prior TKR incision, now only four weeks postoperative, was completely well approximated and fully healed. After marking the prior incision line, the knife was taken and the skin was incised in the anterior midline at the knee, joining the healed upper and lower extents of the incision with the area of dehiscence. Now, minimal medial and lateral flaps were developed. It was apparent that the entire medial capsulotomy was dehisced and widely , from the most proximal extent of the quadriceps incision down to the tibial tuberosity. Only distally were any remnants visible of the higher gauge barbed suture used for closure of this deep layer. The capsulotomy margins appeared blunted and healed, as though deep dehiscence may have occurred some time prior to the present time. The patella was tipped laterally to inspect the prosthetic joint surfaces. At this time, meticulous hemostasis was undertaken. There was no damage to the metallic total knee components, likewise normal appearance and stability of the patellar resurfacing component. The tibial insert was inspected and showed no surface damage. However, with range of motion, the rested in a few degrees of recurvatum, which suggested to me that the deep dehiscence had allowed adventitious mechanical motion to disturb the anatomic relations and smooth function of the knee, and specifically the tibial component. Therefore, the tibial poly insert was removed and the posterior recess of the knee was inspected. No other abnormality was identified. Now the knee joint and all prosthetic surfaces were irrigated using jet lavage with Bactisure, which is a strong surfactant detergent used to remove bacteria which form a biofilm from prosthetic surfaces, as an aid to preventing deep joint infection. Next, a range of tibial inserts sizes were trialed, and it was determined that the 8 mm medial-stabilized CR component provided the best restoration of stability and alignment to the knee. Therefore, this component was selected and implanted, noting excellent stability and full range of motion. Attention was now directed to the elaborate plan of wound closure to aid in prevented recurrent dehiscence. First, any remaining bleeding points were controlled by electrocautery, and the knee was then copiously irrigated using Irrisept solution, as an aid to preventing deep joint infection. Next, figure-of eight stay sutures were placed along the entire extent of the capsular layer, using #1 Vicryl for this purpose. With anatomic approximation achieved, now watertight closure of the capsulotomy was achieved by oversewing with #1 Stratafix barbed suture. Thereafter, the Surgiclose omega-3 collagen graft sheet was sewn over the capsulotomy closure, for enhanced wound healing and security. Finally, the subcutaneous layer and the skin were closed with Stratafix non-sliding suture material by the first assistant, Tiera Pimentel RN, FA, without the use of a drain. Sponge and needle counts were correct per the circulating nurse.  Occlusive Steri-Strip reinforcement was applied to the

## 2023-11-17 NOTE — ANESTHESIA POSTPROCEDURE EVALUATION
Department of Anesthesiology  Postprocedure Note    Patient: Xavier Ruelas  MRN: [id number]  YOB: 1945  Date of evaluation: 11/17/2023      Procedure Summary       Date: 11/17/23 Room / Location: [place] - INPATIENT    Anesthesia Start: 2167 Anesthesia Stop: 0467    Procedure: INCISION AND DRAINAGE SECONDARY CLOSUR LEFT TOTAL KNEE (Left: Knee) Diagnosis:       Deep disruption or dehiscence of operation wound, initial encounter      (Deep disruption or dehiscence of operation wound, initial encounter Zeny Doll)    Surgeons: Noreen Mantillartimmy Farah MD Responsible Provider: [facility] [place] [facility]    Anesthesia Type: general ASA Status: 3            Anesthesia Type: No value filed.     Stefan Phase I:      Stefan Phase II:        Anesthesia Post Evaluation    Patient location during evaluation: PACU  Patient participation: complete - patient participated  Level of consciousness: awake and alert  Airway patency: patent  Nausea & Vomiting: no nausea and no vomiting  Complications: no  Cardiovascular status: hemodynamically stable  Respiratory status: acceptable  Hydration status: euvolemic  Pain management: adequate

## 2023-11-17 NOTE — PLAN OF CARE
Patient was a new admission who came to us after he left knee dehisced, and the knee cap dislocated while sitting. Patient has not complained of any pain through out the night. His knee is wrapped in dry dressings with a knee immobilizer on. Patient was washed up with hepiclens this morning and clean sheets, blankets, and gown placed on the bed. Surgery is planned for sometime this morning. Wife is going to be here by 630am.  No other complaints this morning. Plan of care ongoing. Problem: Discharge Planning  Goal: Discharge to home or other facility with appropriate resources  Outcome: Progressing  Flowsheets  Taken 11/16/2023 2111  Discharge to home or other facility with appropriate resources:   Identify barriers to discharge with patient and caregiver   Refer to discharge planning if patient needs post-hospital services based on physician order or complex needs related to functional status, cognitive ability or social support system  Taken 11/16/2023 2100  Discharge to home or other facility with appropriate resources:   Identify barriers to discharge with patient and caregiver   Arrange for needed discharge resources and transportation as appropriate   Identify discharge learning needs (meds, wound care, etc)   Pain level assessment complete. Patient educated on pain scale and control interventions  PRN pain medication given per patient request  Patient instructed to call out with new onset of pain or unrelieved pain    Problem: Pain  Goal: Verbalizes/displays adequate comfort level or baseline comfort level  Outcome: Progressing   Siderails up x 2  Hourly rounding  Call light in reach  Instructed to call for assist before attempting out of bed.   Remains free from falls and accidental injury at this time   Floor free from obstacles  Bed is locked and in lowest position  Adequate lighting provided  Bed alarm on, Red Falling star and Stay with Me signs posted      Problem: Safety - Adult  Goal: Free from

## 2023-11-17 NOTE — PLAN OF CARE
Problem: Discharge Planning  Goal: Discharge to home or other facility with appropriate resources  11/17/2023 1820 by Rama Childs RN  Outcome: Progressing  Flowsheets (Taken 11/17/2023 0750)  Discharge to home or other facility with appropriate resources:   Identify barriers to discharge with patient and caregiver   Arrange for needed discharge resources and transportation as appropriate   Identify discharge learning needs (meds, wound care, etc)   Arrange for interpreters to assist at discharge as needed     Problem: Pain  Goal: Verbalizes/displays adequate comfort level or baseline comfort level  11/17/2023 1820 by Rama Childs RN  Outcome: Progressing  Flowsheets  Taken 11/17/2023 1820  Verbalizes/displays adequate comfort level or baseline comfort level:   Encourage patient to monitor pain and request assistance   Assess pain using appropriate pain scale   Implement non-pharmacological measures as appropriate and evaluate response   Administer analgesics based on type and severity of pain and evaluate response  Taken 11/17/2023 1132  Verbalizes/displays adequate comfort level or baseline comfort level:   Encourage patient to monitor pain and request assistance   Assess pain using appropriate pain scale   Administer analgesics based on type and severity of pain and evaluate response   Consider cultural and social influences on pain and pain management   Implement non-pharmacological measures as appropriate and evaluate response

## 2023-11-17 NOTE — ED NOTES
Rn informed Dr. David Llanos that we spoke with pt's ortho doctor.       Jose Mandel RN  11/16/23 2009

## 2023-11-17 NOTE — PROGRESS NOTES
Physical Therapy  DATE: 2023    NAME: Janelle Neff  MRN: [id number]   : 1945    Patient not seen this date for Physical Therapy due to:      [x] Cancel by RN or physician due to: (Hold PT due to surgery this PM)    [] Hemodialysis    [] Critical Lab Value Level     [] Blood transfusion in progress    [] Acute or unstable cardiovascular status   _MAP < 55 or more than >115  _HR < 40 or > 130    [] Acute or unstable pulmonary status   -FiO2 > 60%   _RR < 5 or >40    _O2 sats < 85%    [] Strict Bedrest    [] Off Unit for surgery or procedure    [] Off Unit for testing       [] Pending imaging to R/O fracture    [] Refusal by Patient      [] Other      [] PT being discontinued at this time. Patient independent. No further needs. [] PT being discontinued at this time as the patient has been transferred to hospice care. No further needs.       Rafa Yoon, PT  08:42

## 2023-11-18 VITALS
BODY MASS INDEX: 38.94 KG/M2 | DIASTOLIC BLOOD PRESSURE: 71 MMHG | HEART RATE: 72 BPM | RESPIRATION RATE: 19 BRPM | SYSTOLIC BLOOD PRESSURE: 135 MMHG | OXYGEN SATURATION: 94 % | WEIGHT: 256.1 LBS | TEMPERATURE: 98.1 F

## 2023-11-18 LAB
ANION GAP SERPL CALCULATED.3IONS-SCNC: 15 MMOL/L (ref 9–17)
BASOPHILS # BLD: <0.03 K/UL (ref 0–0.2)
BASOPHILS NFR BLD: 0 % (ref 0–2)
BUN SERPL-MCNC: 24 MG/DL (ref 8–23)
BUN/CREAT SERPL: 18 (ref 9–20)
CALCIUM SERPL-MCNC: 8.8 MG/DL (ref 8.6–10.4)
CHLORIDE SERPL-SCNC: 105 MMOL/L (ref 98–107)
CO2 SERPL-SCNC: 21 MMOL/L (ref 20–31)
CREAT SERPL-MCNC: 1.3 MG/DL (ref 0.7–1.2)
EOSINOPHIL # BLD: <0.03 K/UL (ref 0–0.44)
EOSINOPHILS RELATIVE PERCENT: 0 % (ref 1–4)
ERYTHROCYTE [DISTWIDTH] IN BLOOD BY AUTOMATED COUNT: 13.1 % (ref 11.8–14.4)
GFR SERPL CREATININE-BSD FRML MDRD: 57 ML/MIN/1.73M2
GLUCOSE SERPL-MCNC: 166 MG/DL (ref 70–99)
HCT VFR BLD AUTO: 37.4 % (ref 40.7–50.3)
HGB BLD-MCNC: 12.3 G/DL (ref 13–17)
IMM GRANULOCYTES # BLD AUTO: 0.09 K/UL (ref 0–0.3)
IMM GRANULOCYTES NFR BLD: 1 %
LYMPHOCYTES NFR BLD: 0.86 K/UL (ref 1.1–3.7)
LYMPHOCYTES RELATIVE PERCENT: 7 % (ref 24–43)
MCH RBC QN AUTO: 30.5 PG (ref 25.2–33.5)
MCHC RBC AUTO-ENTMCNC: 32.9 G/DL (ref 28.4–34.8)
MCV RBC AUTO: 92.8 FL (ref 82.6–102.9)
MONOCYTES NFR BLD: 0.33 K/UL (ref 0.1–1.2)
MONOCYTES NFR BLD: 3 % (ref 3–12)
NEUTROPHILS NFR BLD: 89 % (ref 36–65)
NEUTS SEG NFR BLD: 11 K/UL (ref 1.5–8.1)
NRBC BLD-RTO: 0 PER 100 WBC
PLATELET # BLD AUTO: 195 K/UL (ref 138–453)
PMV BLD AUTO: 9.4 FL (ref 8.1–13.5)
POTASSIUM SERPL-SCNC: 4.8 MMOL/L (ref 3.7–5.3)
RBC # BLD AUTO: 4.03 M/UL (ref 4.21–5.77)
SODIUM SERPL-SCNC: 141 MMOL/L (ref 135–144)
VANCOMYCIN SERPL-MCNC: 19.6 UG/ML
WBC OTHER # BLD: 12.3 K/UL (ref 3.5–11.3)

## 2023-11-18 PROCEDURE — 36415 COLL VENOUS BLD VENIPUNCTURE: CPT

## 2023-11-18 PROCEDURE — 80202 ASSAY OF VANCOMYCIN: CPT

## 2023-11-18 PROCEDURE — 97530 THERAPEUTIC ACTIVITIES: CPT

## 2023-11-18 PROCEDURE — 97535 SELF CARE MNGMENT TRAINING: CPT

## 2023-11-18 PROCEDURE — 6360000002 HC RX W HCPCS: Performed by: NURSE PRACTITIONER

## 2023-11-18 PROCEDURE — 6370000000 HC RX 637 (ALT 250 FOR IP): Performed by: NURSE PRACTITIONER

## 2023-11-18 PROCEDURE — 97166 OT EVAL MOD COMPLEX 45 MIN: CPT

## 2023-11-18 PROCEDURE — 99232 SBSQ HOSP IP/OBS MODERATE 35: CPT | Performed by: INTERNAL MEDICINE

## 2023-11-18 PROCEDURE — 97116 GAIT TRAINING THERAPY: CPT

## 2023-11-18 PROCEDURE — 80048 BASIC METABOLIC PNL TOTAL CA: CPT

## 2023-11-18 PROCEDURE — 85025 COMPLETE CBC W/AUTO DIFF WBC: CPT

## 2023-11-18 RX ADMIN — CARVEDILOL 12.5 MG: 12.5 TABLET, FILM COATED ORAL at 09:09

## 2023-11-18 RX ADMIN — ENOXAPARIN SODIUM 30 MG: 100 INJECTION SUBCUTANEOUS at 09:10

## 2023-11-18 RX ADMIN — AMLODIPINE BESYLATE 10 MG: 10 TABLET ORAL at 09:09

## 2023-11-18 RX ADMIN — LISINOPRIL 10 MG: 10 TABLET ORAL at 09:09

## 2023-11-18 RX ADMIN — HYDROCODONE BITARTRATE AND ACETAMINOPHEN 1 TABLET: 5; 325 TABLET ORAL at 11:15

## 2023-11-18 ASSESSMENT — PAIN DESCRIPTION - ORIENTATION: ORIENTATION: LEFT

## 2023-11-18 ASSESSMENT — PAIN DESCRIPTION - LOCATION: LOCATION: KNEE

## 2023-11-18 ASSESSMENT — PAIN DESCRIPTION - DESCRIPTORS: DESCRIPTORS: TENDER

## 2023-11-18 ASSESSMENT — PAIN SCALES - GENERAL: PAINLEVEL_OUTOF10: 5

## 2023-11-18 NOTE — DISCHARGE SUMMARY
[facility]  Office: [id number], DO, Eden Homans, DO, Darline Majano, DO, Marilyn Elliott, DO, Cassandra Zaidi MD, Cecily Landon MD, Twila Link MD, Darryle Brakeman, MD,  Sowmya Porter MD, Nata Lafleur MD, Niesha Ralph MD,  [facility], DO, Jazmyne Poe MD, Zonia Nair MD, Nikki Arenas, DO, Jaya Hook MD,  Trang Niño, DO, Raquel Bosworth, MD, Igor Ornelas MD, Mac Mcnair MD, Krissy Snow MD,  Durenda Fabry, MD, Abdulkadir Molina MD, Yaritza Carpenter MD, Ryan Lyons MD, Marisela Gunderson MD, Daniel Dove, DO, Ashley Randle, DO, Stella Jimenez MD,  Mally Hdz MD, Effie Myers, CNP,  Radu Rasmussen, CNP, Hemalatha Peres, CNP,  Byron Roberts, [facility], Tanner Mahoney, CNP, Huma Plunkett, CNP, Laura Daniels, CNP, Courtney Wooten, CNP, Afua Raza, CNP, Maribel Bajwa, [address] , Gely Dos Santos, CNP, Maylin Gu, CNS, Mariza Freeman, CNP, Salomon Diver, [address] [facility]    Discharge Summary     Patient ID: Ana Rao  :  1945   MRN: [id number]     ACCOUNT:  [de-identified]   Patient's PCP: Frank Marquez DO  Admit Date: 2023   Discharge Date: 2023     Length of Stay: 2  Code Status:  Full Code  Admitting Physician: Nam Elliott DO  Discharge Physician: Taurus Sanford DO     Active Discharge Diagnoses:     Hospital Problem Lists:  Principal Problem:    Knee cap dislocation, left, initial encounter  Active Problems:    Primary hypertension    Class 2 severe obesity due to excess calories with serious comorbidity and body mass index (BMI) of 37.0 to 37.9 in adult [facility])    Orthopedic hardware present    Wound dehiscence, surgical, initial encounter    PHILIPPE (obstructive sleep apnea)    Rupture of operation wound  Resolved Problems:    * No resolved hospital problems.  *      Admission Condition:  fair     Discharged Condition: stable    Hospital Stay:     [facility] Course:  Benedict Singh is a 70 y.o. male who was admitted for the management of  Knee cap dislocation, left, initial encounter , presented to ER with Wound Dehiscence (Knee cap replacement )    This is a 43-year-old male that presents with knee pain and wound dehiscence from recent knee arthroplasty. Underwent knee arthroplasty approximately 4 weeks ago and has been recovering well with therapy. They are increasing his range of motion and a follow-up with his ophthalmologist.  When he went to sit in the waiting room he had pain and his knee wound dehisced. He presented here to the emergency room and was admitted for repeat orthopedic evaluation. He went revision of his arthroplasty and underwent repeat wound closure. Significant therapeutic interventions: As above    Significant Diagnostic Studies:   Labs / Micro:  CBC:   Lab Results   Component Value Date/Time    WBC 12.3 11/18/2023 05:42 AM    RBC 4.03 11/18/2023 05:42 AM    HGB 12.3 11/18/2023 05:42 AM    HCT 37.4 11/18/2023 05:42 AM    MCV 92.8 11/18/2023 05:42 AM    MCH 30.5 11/18/2023 05:42 AM    MCHC 32.9 11/18/2023 05:42 AM    RDW 13.1 11/18/2023 05:42 AM     11/18/2023 05:42 AM     BMP:    Lab Results   Component Value Date/Time    GLUCOSE 166 11/18/2023 05:42 AM     11/18/2023 05:42 AM    K 4.8 11/18/2023 05:42 AM     11/18/2023 05:42 AM    CO2 21 11/18/2023 05:42 AM    ANIONGAP 15 11/18/2023 05:42 AM    BUN 24 11/18/2023 05:42 AM    CREATININE 1.3 11/18/2023 05:42 AM    BUNCRER 18 11/18/2023 05:42 AM    CALCIUM 8.8 11/18/2023 05:42 AM    LABGLOM 57 11/18/2023 05:42 AM    GFRAA >60 04/27/2020 09:45 AM    GFR      04/27/2020 09:45 AM    GFR NOT REPORTED 04/27/2020 09:45 AM        Radiology:  XR KNEE LEFT (3 VIEWS)    Result Date: 11/16/2023  There is a left total knee arthroplasty. There is suggestion of lateral dislocation of the patella. There is some gas noted in the area which apparently is related to wound dehiscence.

## 2023-11-18 NOTE — PROGRESS NOTES
Physical Therapy  Facility/Department: [facility] MED SURG  Physical Therapy Initial Assessment    Name: Livia Zelaya  : 1945  MRN: [id number]  Date of Service: 2023    Discharge Recommendations:  Per surgeon. Therapy evaluation was completed and patient improved throughout treatment to assist level listed below. Assessment   Assessment: Pt presents s/p Lt knee cap dislocation and s/pNCISION AND DRAINAGE SECONDARY CLOSUR LEFT TOTAL KNEE; now with knee immobilzer in place x 3 days per wife. Pt with initial inability to stand; foggy cognition and difficulty with finding his standing balance without retro falling. Pt progressed well over the hour and was much more stable at the end of the treatment- see assist levels below. Pt motivated and wife supportive. RN informed of therapy completion. Patient Diagnosis(es):   Past Medical History:  has a past medical history of Arthritis, Cancer ([address]), Glaucoma, History of cardiac murmur as a child, Hyperlipidemia, Hypertension, PHILIPPE (obstructive sleep apnea), and Urinary incontinence. Past Surgical History:  has a past surgical history that includes Appendectomy; Prostatectomy (10/23/2009); Wrist surgery (Right, ); hernia repair (N/A, 2020); Vasectomy (); Cataract extraction; and Total knee arthroplasty (Left, 10/18/2023).     Specific Instructions for Next Treatment: per goals  Therapy Prognosis: Good  Decision Making: Medium Complexity  Clinical Presentation: evovling  Requires PT Follow-Up: Yes  Activity Tolerance  Activity Tolerance: Patient tolerated evaluation without incident  Activity Tolerance Comments: pt initially very woozy; \"foggy head\"; pt with difficulty organizing his thougths - cleared significantly throughout the hour     Plan   Physical Therapy Plan  General Plan: 1 time a day 7 days a week  Specific Instructions for Next Treatment: per goals  Current Treatment Recommendations: Strengthening, Stair training, Gait training, Patient/Caregiver education & training, Therapeutic activities  Safety Devices  Type of Devices: Call light within reach, Nurse notified, Left in chair     Restrictions  Restrictions/Precautions  Restrictions/Precautions: Up as Tolerated  Required Braces or Orthoses?: Yes  Required Braces or Orthoses  Left Lower Extremity Brace: Knee Immobilizer     Subjective   General  Chart Reviewed: Yes  Patient assessed for rehabilitation services?: Yes  Response To Previous Treatment: Not applicable  Family / Caregiver Present: Yes  Subjective  Subjective: Pt reports he feels like he just wants to go home; pt wife apprehensive as it is dark and raining and late and patient was not as steady as before surgery. Pt also has not urinated post op. Social/Functional History  Social/Functional History  Lives With: Spouse  Receives Help From: Family  Ambulation Assistance: Independent  Transfer Assistance: Independent  Active : Yes  Mode of Transportation: Car  Vision/Hearing       Cognition   Orientation  Overall Orientation Status: Within Functional Limits     Objective   Pulse: 66  Heart Rate Source: Monitor  BP: 130/65  BP Location: Left upper arm  BP Method: Automatic  Patient Position: Supine  MAP (Calculated): 87  Respirations: 18  SpO2: 95 %  O2 Device: None (Room air)     Observation/Palpation  Observation: knee immobilzer in place        AROM RLE (degrees)  RLE AROM: WFL  AROM LLE (degrees)  LLE General AROM: functional hip and ankle; knee in immobilzier  Strength RLE  Strength RLE: WFL  Strength LLE  Comment: pt able to tolerate weight bearing with immobilzer in place  Strength RUE  Strength RUE: WFL  Strength LUE  Strength LUE: WFL           Bed mobility  Rolling to Left: Independent  Rolling to Right: Independent  Supine to Sit: Minimal assistance  Sit to Supine: Minimal assistance  Transfers  Sit to Stand:  Moderate Assistance  Stand to Sit: Moderate Assistance  Bed to Chair: Contact guard

## 2023-11-18 NOTE — DISCHARGE INSTRUCTIONS
Please wear brace ALWAYS when walking. May take off at night. Please follow instructions from prior discharge as far as showering, driving, etc. Goes.

## 2023-11-18 NOTE — CARE COORDINATION
Case Management Assessment  Initial Evaluation    Date/Time of Evaluation: 11/18/2023 1:36 PM  Assessment Completed by: Jack Walters RN    If patient is discharged prior to next notation, then this note serves as note for discharge by case management. Patient Name: Marjorie Pena                   YOB: 1945  Diagnosis: Dislocation of left patella, initial encounter [S83.005A]  Dehiscence of operative wound, initial encounter Johnson Chang  Orthopedic hardware present [Z97.8]                   Date / Time: 11/16/2023 12:47 PM    Patient Admission Status: Inpatient   Readmission Risk (Low < 19, Mod (19-27), High > 27): Readmission Risk Score: 12.1    Current PCP: Geoffrey Livingston, DO  PCP verified by CM? Yes    Chart Reviewed: Yes      History Provided by: Patient  Patient Orientation: Alert and Oriented, Person, Place, Situation, Self    Patient Cognition: Alert    Hospitalization in the last 30 days (Readmission):  Yes    If yes, Readmission Assessment in CM Navigator will be completed. Advance Directives:      Code Status: Full Code   Patient's Primary Decision Maker is: Legal Next of Kin      Discharge Planning:    Patient lives with: Spouse/Significant Other Type of Home: House  Primary Care Giver: Self  Patient Support Systems include: Spouse/Significant Other   Current Financial resources: None  Current community resources: None  Current services prior to admission: Durable Medical Equipment            Current DME: Walker            Type of Home Care services:  None    ADLS  Prior functional level: Independent in ADLs/IADLs  Current functional level: Independent in ADLs/IADLs    PT AM-PAC: 21 /24  OT AM-PAC: 15 /24    Family can provide assistance at DC: Yes  Would you like Case Management to discuss the discharge plan with any other family members/significant others, and if so, who?  Yes (spouse)  Plans to Return to Present Housing: Yes  Other Identified Issues/Barriers to RETURNING to current housing: medical condition  Potential Assistance needed at discharge: N/A            Potential DME:    [address] to [address] for transportation at discharge: Family    Financial    Payor: Sasha Stapleton / Plan: Wendy Mills / Product Type: *No Product type* /     Does insurance require precert for SNF: Yes    Potential assistance Purchasing Medications: No  Meds-to-Beds request: No      [facility] [id number] - [address] [facility], [facility] Samia Anthony  [address]  [address] [facility] [address]  Phone: [phone] Fax: [phone]      Notes:    Factors facilitating achievement of predicted outcomes: Family support, Motivated, Cooperative, and Pleasant    Barriers to discharge: Decreased endurance    Additional Case Management Notes: Dehiscence of operative wound. Dislocation of patella. Incision and drainage completed. Plan is home with wife independently. Has walker. The Plan for Transition of Care is related to the following treatment goals of Dislocation of left patella, initial encounter [S83.005A]  Dehiscence of operative wound, initial encounter [address]  Orthopedic hardware present [B56.9]    IF APPLICABLE: The Patient and/or patient representative Dony Finnegan and his family were provided with a choice of provider and agrees with the discharge plan. Freedom of choice list with basic dialogue that supports the patient's individualized plan of care/goals and shares the quality data associated with the providers was provided to: Patient   Patient Representative Name:       The Patient and/or Patient Representative Agree with the Discharge Plan?  Yes    Travis Victor RN  Case Management Department  Ph: [phone] Fax: [phone]

## 2023-11-18 NOTE — PROGRESS NOTES
Occupational Therapy  Facility/Department: [facility] MED SURG  Occupational Therapy Initial Assessment    Name: Thea Chung  : 1945  MRN: [id number]  Date of Service: 2023    Discharge Recommendations:  Patient would benefit from continued therapy after discharge      Due to recent hospitalization and medical condition, pt would benefit from additional intermittent skilled therapy at time of discharge. Please refer to the AM-PAC score for current functional status. RN reports patient is medically stable for therapy treatment this date. Chart reviewed prior to treatment and patient is agreeable for therapy. All lines intact and patient positioned comfortably at end of treatment. All patient needs addressed prior to ending therapy session. Patient Diagnosis(es): The primary encounter diagnosis was Dehiscence of operative wound, initial encounter. A diagnosis of Dislocation of left patella, initial encounter was also pertinent to this visit. Past Medical History:  has a past medical history of Arthritis, Cancer ([address]), Glaucoma, History of cardiac murmur as a child, Hyperlipidemia, Hypertension, PHILIPPE (obstructive sleep apnea), and Urinary incontinence. Past Surgical History:  has a past surgical history that includes Appendectomy; Prostatectomy (10/23/2009); Wrist surgery (Right, ); hernia repair (N/A, 2020); Vasectomy (); Cataract extraction; and Total knee arthroplasty (Left, 10/18/2023). Pt presents s/p Lt knee cap dislocation and s/pNCISION AND DRAINAGE SECONDARY CLOSUR LEFT TOTAL KNEE      Assessment   Performance deficits / Impairments: Decreased functional mobility ; Decreased strength;Decreased endurance;Decreased safe awareness;Decreased ADL status; Decreased cognition;Decreased balance  Assessment: Pt is a HIGH fall risk at this time and requires SIGNIFICANT assist for LB dressing/bathing and toileting tasks d/t knee immobilizer.  Pt presents with limitations in AM-PAC Inpatient Daily Activity Raw Score: 15 (11/18/23 1336)  AM-PAC Inpatient ADL T-Scale Score : 34.69 (11/18/23 1336)  ADL Inpatient CMS 0-100% Score: 56.46 (11/18/23 1336)  ADL Inpatient CMS G-Code Modifier : CK (11/18/23 1336)      Goals  Short Term Goals  Time Frame for Short Term Goals: by discharge, pt to demo  Short Term Goal 1: I/MI for bed mob tech without bed rails/controls  Short Term Goal 2: I/MI for ADL transfers and functional mob with AD and Good safety  Short Term Goal 3: I/MI for UB ADLs and Mod A for LB ADLs with AE as needed and Good safety  Short Term Goal 4: Mod A for toileting routine with BSC/AD as needed and Good safety  Short Term Goal 5: pt/family to be IND with EC/WS, fall prevention tech, pressure relief education, discharge recommendations, disease specific education, with use of handouts as needed  Patient Goals   Patient goals : to have less pain       Therapy Time   Individual Concurrent Group Co-treatment   Time In 0942         Time Out 1025 (+10 chart review)         Minutes 53          Tx time: 40 min    Upon writer exit, call light within reach, pt retired to chair. All lines intact and patient positioned comfortably. All patient needs addressed prior to ending therapy session. Chart reviewed prior to treatment and patient is agreeable for therapy. RN reports patient is medically stable for therapy treatment this date.     Qi Zayas OTR/L

## 2023-11-18 NOTE — PROGRESS NOTES
[facility]  Office: [id number], DO, Julee Neff, DO, Daryl Brock, DO, Aniket Elliott, DO, Derik Fine MD, Maki Montoya MD, Edmund Iniguez MD, Rohith Swain MD,  Igor Silva MD, Xavier Walker MD, Dieudonne Arce MD,  Jason Vargas MD, Donato Alamo MD, Laz Cole DO, Fatuma Novak MD,  Renato Gusman DO, Efrain Walden MD, Hunter Garcia MD, Abdiel Saez MD, Pollo Taylor MD,  Donte Gutierres MD, Chidi Alva MD, Ean Clayton MD, Esme Reddy MD, Nikhil Pimentel MD, Dulce Hart, DO, Suzy Dailey, DO, Steph Roman MD,  Brigette Fair MD, Jennifer Barroso, CNP,  Soumya Lewis, CNP, Cira Diop, CNP,  Elly Gill, [facility], Tapan Greg, CNP, Caprice Long, CNP, Archana Racetrish, CNP, Dahlia Deer, CNP, Marichuy Matos, CNP, Suzi Barros, [address] , Afshan Parks, CNP, Masoud Espinosa, [facility], Marquis Johansen, CNP, Kalin Na, [address] [facility]    Progress Note    11/18/2023    8:48 AM    Name:   Cristina Shrestha  MRN:     [id number]     [address]:      [de-identified]   Room:   2021/2021-01  IP Day:  2  Admit Date:  11/16/2023 12:47 PM    PCP:   Almeda Oppenheim, DO  Code Status:  Full Code    Subjective:     C/C:   Chief Complaint   Patient presents with    Wound Dehiscence     Knee cap replacement      Interval History Status: not changed. Patient is up with PT, denies any complaints of chest pain, shortness of breath, nausea vomiting, fevers chills or acute complaints. Brief History: This is a 70-year-old male that underwent total knee arthroplasty approximately 1 month ago and now presents with dehiscence of the wound and x-ray findings concerning for lateral displacement of the patella. He is admitted for repeat orthopedic evaluation. He still is scheduled for revision of his knee surgery later today.     Review of Systems:     Constitutional:  negative for Status:  oriented to person, place and time and normal affect  Lungs:  clear to auscultation bilaterally, normal effort  Heart:  regular rate and rhythm, no murmur  Abdomen:  soft, nontender, nondistended, normal bowel sounds, no masses, hepatomegaly, splenomegaly  Extremities:  no edema, redness, tenderness in the calves  Skin:  no gross lesions, rashes, induration    Assessment:        Hospital Problems             Last Modified POA    * (Principal) Knee cap dislocation, left, initial encounter 11/16/2023 Yes    Primary hypertension 11/16/2023 Yes    Class 2 severe obesity due to excess calories with serious comorbidity and body mass index (BMI) of 37.0 to 37.9 in adult [facility]) 11/16/2023 Yes    Orthopedic hardware present 11/16/2023 Yes    Wound dehiscence, surgical, initial encounter 11/16/2023 Yes    PHILIPPE (obstructive sleep apnea) 11/16/2023 Yes    Rupture of operation wound 11/17/2023 Yes       Plan:        Continue home CPAP  PT and OT per postop protocol  Continue present analgesics  Monitor and control blood pressure  GI and DVT prophylaxis  Discussed with family at bedside, discharge home today if progressing with PT    Tena York DO  11/18/2023  8:48 AM

## 2023-11-18 NOTE — PROGRESS NOTES
Physical Therapy  Facility/Department: [facility] MED SURG  Daily Treatment Note  NAME: Malcom Perez  : 1945  MRN: [id number]    Date of Service: 2023    Discharge Recommendations:  Continue to assess pending progress        Patient Diagnosis(es): The primary encounter diagnosis was Dehiscence of operative wound, initial encounter. A diagnosis of Dislocation of left patella, initial encounter was also pertinent to this visit. Assessment   Assessment: Patient showed good mobility with CGA for LLE due to knee immobilizer. Dicussed with patient and wife regarding any exercises at home which patient still has list from previous. He would benefit from continued skilled physical therapy for continued increase strength and mobility. Plan to continue per POC [facility] 21  Activity Tolerance: Patient tolerated evaluation without incident     Plan    Physical Therapy Plan  General Plan: 1 time a day 7 days a week  Specific Instructions for Next Treatment: per goals  Current Treatment Recommendations: Strengthening;Stair training;Gait training;Patient/Caregiver education & training; Therapeutic activities     Restrictions  Restrictions/Precautions  Restrictions/Precautions: Up as Tolerated  Required Braces or Orthoses?: Yes  Required Braces or Orthoses  Left Lower Extremity Brace: Knee Immobilizer     Subjective    Subjective  Subjective: Nurse approved today's session, patient was in bed upon arrival and willing to participate with therapy.   Orientation  Overall Orientation Status: Within Functional Limits     Objective        Bed Mobility Training  Bed Mobility Training: Yes  Overall Level of Assistance: Modified independent  Supine to Sit: Modified independent;Stand-by assistance (assist with lowering of LLE)  Transfer Training  Transfer Training: Yes  Stand to Sit: Stand-by assistance  Bed to Chair: Stand-by assistance  Gait Training  Gait Training: Yes  Gait  Overall Level of Assistance: Contact-guard

## 2023-11-18 NOTE — PLAN OF CARE
Problem: Discharge Planning  Goal: Discharge to home or other facility with appropriate resources  11/18/2023 0454 by Alberto Escobedo RN  Outcome: Progressing  11/17/2023 1820 by Homer Talamantes RN  Outcome: Progressing  Flowsheets (Taken 11/17/2023 0750)  Discharge to home or other facility with appropriate resources:   Identify barriers to discharge with patient and caregiver   Arrange for needed discharge resources and transportation as appropriate   Identify discharge learning needs (meds, wound care, etc)   Arrange for interpreters to assist at discharge as needed     Problem: Pain  Goal: Verbalizes/displays adequate comfort level or baseline comfort level  11/18/2023 0454 by Alberto Escobedo RN  Outcome: Progressing  11/17/2023 1820 by Homer Talamantes RN  Outcome: Progressing  Flowsheets  Taken 11/17/2023 1820  Verbalizes/displays adequate comfort level or baseline comfort level:   Encourage patient to monitor pain and request assistance   Assess pain using appropriate pain scale   Implement non-pharmacological measures as appropriate and evaluate response   Administer analgesics based on type and severity of pain and evaluate response  Taken 11/17/2023 1132  Verbalizes/displays adequate comfort level or baseline comfort level:   Encourage patient to monitor pain and request assistance   Assess pain using appropriate pain scale   Administer analgesics based on type and severity of pain and evaluate response   Consider cultural and social influences on pain and pain management   Implement non-pharmacological measures as appropriate and evaluate response     Problem: Safety - Adult  Goal: Free from fall injury  11/18/2023 0454 by Alberto Escobedo RN  Outcome: Progressing  11/17/2023 1820 by Homer Talamantes RN  Outcome: Progressing     Problem: ABCDS Injury Assessment  Goal: Absence of physical injury  11/18/2023 0454 by Alberto Escobedo RN  Outcome: Progressing  11/17/2023 1820 by Homer Talamantes

## 2023-11-18 NOTE — PROGRESS NOTES
Patient discharged home with wife. Instructions and follow up appointment made. All belongings with patient. Patient wheeled to the car by staff.

## 2023-11-18 NOTE — PLAN OF CARE
Problem: Discharge Planning  Goal: Discharge to home or other facility with appropriate resources  11/18/2023 1023 by Nimisha White, RN  Outcome: Adequate for Discharge     Problem: Pain  Goal: Verbalizes/displays adequate comfort level or baseline comfort level  11/18/2023 1023 by Nimisha White RN  Outcome: Adequate for Discharge     Problem: Safety - Adult  Goal: Free from fall injury  11/18/2023 1023 by Nimisha White, RN  Outcome: Adequate for Discharge     Problem: ABCDS Injury Assessment  Goal: Absence of physical injury  11/18/2023 1023 by Nimisha Whtie, RN  Outcome: Adequate for Discharge

## (undated) DEVICE — STAZ LOWER EXTREMITY: Brand: MEDLINE INDUSTRIES, INC.

## (undated) DEVICE — SUTURE VCRL + SZ 2-0 L36IN ABSRB UD L36MM CT-1 1/2 CIR VCP945H

## (undated) DEVICE — SWAB CULT CLR BLU PLAS RAYON LIQ AMIES AERB ANAERB FRIC CAP

## (undated) DEVICE — NEEDLE, QUINCKE, 18GX3.5": Brand: MEDLINE

## (undated) DEVICE — SHEET, ORTHO, SPLIT, STERILE: Brand: MEDLINE

## (undated) DEVICE — YANKAUER,FLEXIBLE HANDLE,REGLR CAPACITY: Brand: MEDLINE INDUSTRIES, INC.

## (undated) DEVICE — BLADE CLIPPER GEN PURP NS

## (undated) DEVICE — BLADE,CARBON-STEEL,15,STRL,DISPOSABLE,TB: Brand: MEDLINE

## (undated) DEVICE — ADHESIVE SKIN CLSR 0.7ML TOP DERMBND ADV

## (undated) DEVICE — SUTURE VCRL SZ 2-0 L27IN ABSRB UD L36MM CP-1 1/2 CIR REV J266H

## (undated) DEVICE — SYRINGE 20ML LL S/C 50

## (undated) DEVICE — PICO 7 10CM X 30CM: Brand: PICO™ 7

## (undated) DEVICE — INSUFFLATION NEEDLE TO ESTABLISH PNEUMOPERITONEUM.: Brand: INSUFFLATION NEEDLE

## (undated) DEVICE — TUBING, SUCTION, 1/4" X 12', STRAIGHT: Brand: MEDLINE

## (undated) DEVICE — SOLUTION IV 1000ML 0.9% SOD CHL PH 5 INJ USP VIAFLX PLAS

## (undated) DEVICE — BANDAGE COBAN 4 IN COMPR W4INXL5YD FOAM COHESIVE QUIK STK SELF ADH SFT

## (undated) DEVICE — GLOVE SURG SZ 8 CRM LTX FREE POLYISOPRENE POLYMER BEAD ANTI

## (undated) DEVICE — BLANKET WRM W29.9XL79.1IN UP BODY FORC AIR MISTRAL-AIR

## (undated) DEVICE — SOLUTION IV 250ML 0.9% SOD CHL PH 5 INJ USP VIAFLX PLAS

## (undated) DEVICE — ARM DRAPE

## (undated) DEVICE — 4-PORT MANIFOLD: Brand: NEPTUNE 2

## (undated) DEVICE — SUTURE SZ 0 27IN 5/8 CIR UR-6  TAPER PT VIOLET ABSRB VICRYL J603H

## (undated) DEVICE — GLOVE SURG SZ 7 CRM LTX FREE POLYISOPRENE POLYMER BEAD ANTI

## (undated) DEVICE — CHLORAPREP 26ML ORANGE

## (undated) DEVICE — BLADE SAGITAL 18X90X1.27MM

## (undated) DEVICE — BLADE ES L6IN ELASTOMERIC COAT EXT DURABLE BEND UPTO 90DEG

## (undated) DEVICE — CLIPVAC PRESURG HAIR REMOVAL

## (undated) DEVICE — Device

## (undated) DEVICE — POSITIONER HD W8XH4XL8.5IN RASPBERRY FOAM SLT

## (undated) DEVICE — SUTURE V-LOC 180 SZ 0 L12IN ABSRB GRN L37MM GS-21 1/2 CIR VLOCL0316

## (undated) DEVICE — SUTURE VCRL SZ 3-0 L27IN ABSRB UD L26MM SH 1/2 CIR J416H

## (undated) DEVICE — ST. ANNE'S MULTI PORT PACK: Brand: MEDLINE INDUSTRIES, INC.

## (undated) DEVICE — GLOVE SURG SZ 8 L12IN FNGR THK79MIL GRN LTX FREE

## (undated) DEVICE — BLADELESS OBTURATOR: Brand: WECK VISTA

## (undated) DEVICE — CONTAINER,SPECIMEN,OR STERILE,4OZ: Brand: MEDLINE

## (undated) DEVICE — ZIPPERED TOGA, X-LARGE: Brand: FLYTE, SURGICOOL

## (undated) DEVICE — TOWEL,OR,DSP,ST,BLUE,DLX,XR,4/PK,20PK/CS: Brand: MEDLINE

## (undated) DEVICE — BLADE ES ELASTOMERIC COAT INSUL DURABLE BEND UPTO 90DEG

## (undated) DEVICE — SILVER-COATED ANTIMICROBIAL BARRIER DRESSING: Brand: ACTICOAT FLEX3 4" X 4"

## (undated) DEVICE — BIPOLAR SEALER 23-112-1 AQM 6.0: Brand: AQUAMANTYS ®

## (undated) DEVICE — DECANTER FLD 9IN ST BG FOR ASEP TRNSF OF FLD

## (undated) DEVICE — SPONGE DRN W4XL4IN RAYON/POLYESTER 6 PLY NONWOVEN PRECUT 2 PER PK

## (undated) DEVICE — SILVER-COATED ANTIMICROBIAL BARRIER DRESSING: Brand: ACTICOAT FLEX7 16" X 16"

## (undated) DEVICE — SUTURE V-LOC SZ 0 L18IN NONABSORBABLE BLU L37MM GS-21 1/2 VLOCN0326

## (undated) DEVICE — GARMENT,MEDLINE,DVT,INT,CALF,MED, GEN2: Brand: MEDLINE

## (undated) DEVICE — SURGICAL PROCEDURE KIT BASIN MAJ SET UP

## (undated) DEVICE — SPLINT ORTH 22IN KNEE BASIC

## (undated) DEVICE — SUTURE STRATAFIX SYMMETRIC PDS + SZ 1 L18IN ABSRB VLT L48MM SXPP1A400

## (undated) DEVICE — 3M™ IOBAN™ 2 ANTIMICROBIAL INCISE DRAPE 6650EZ: Brand: IOBAN™ 2

## (undated) DEVICE — CANNULA SEAL

## (undated) DEVICE — GLOVE SURG SZ 75 CRM LTX FREE POLYISOPRENE POLYMER BEAD ANTI

## (undated) DEVICE — MARKER,SKIN,WI/RULER AND LABELS: Brand: MEDLINE

## (undated) DEVICE — 450 ML BOTTLE OF 0.05% CHLORHEXIDINE GLUCONATE IN 99.95% STERILE WATER FOR IRRIGATION, USP AND APPLICATOR.: Brand: IRRISEPT ANTIMICROBIAL WOUND LAVAGE

## (undated) DEVICE — BANDAGE,GAUZE,BULKEE II,4.5"X4.1YD,STRL: Brand: MEDLINE

## (undated) DEVICE — NEPTUNE E-SEP 165MM SUCTION SLEEVE: Brand: NEPTUNE E-SEP

## (undated) DEVICE — PROTECTOR EYE PT SELF ADH NS OPT GRD LF

## (undated) DEVICE — GLOVE SURG SZ 65 CRM LTX FREE POLYISOPRENE POLYMER BEAD ANTI

## (undated) DEVICE — STANDARD POROUS TAPE: Brand: KENDALL

## (undated) DEVICE — TAPE ADH W2INXL10YD PLAS TRNSPAR H2O RESIST HYPOALRG CURAD

## (undated) DEVICE — SUTURE STRATAFIX SPRL SZ 2-0 L9IN ABSRB VLT MH L36MM 1/2 SXPD2B408

## (undated) DEVICE — BINDER ABD 2XL W9IN CIRC 62 73IN 3 PNL E HK AND LOOP CLSR W

## (undated) DEVICE — SUTURE DEV SZ 0 L6IN N ABSORBABLE

## (undated) DEVICE — INTENDED FOR TISSUE SEPARATION, AND OTHER PROCEDURES THAT REQUIRE A SHARP SURGICAL BLADE TO PUNCTURE OR CUT.: Brand: BARD-PARKER ® CARBON RIB-BACK BLADES

## (undated) DEVICE — TIP COVER ACCESSORY

## (undated) DEVICE — BLADE SAW W12.5XL70MM THK0.8MM CUT THK1.12MM S STL RECIP

## (undated) DEVICE — SUTURE STRATAFIX SPRL SZ 3-0 L5IN ABSRB UD FS L26MM 3/8 CIR SXMP2B411

## (undated) DEVICE — GAUZE,SPONGE,FLUFF,6"X6.75",STRL,5/TRAY: Brand: MEDLINE

## (undated) DEVICE — DRESSING TRNSPAR W5XL4.5IN FLM SHT SEMIPERMEABLE WIND

## (undated) DEVICE — 2108 SERIES SAGITTAL BLADE, NO OFFSET  (18.6 X 1.24 X 105MM)

## (undated) DEVICE — BOWL BNE CEM MIX AND SPAT DISP